# Patient Record
Sex: FEMALE | Race: WHITE | NOT HISPANIC OR LATINO | URBAN - METROPOLITAN AREA
[De-identification: names, ages, dates, MRNs, and addresses within clinical notes are randomized per-mention and may not be internally consistent; named-entity substitution may affect disease eponyms.]

---

## 2017-08-03 ENCOUNTER — OFFICE VISIT (OUTPATIENT)
Dept: INTERNAL MEDICINE | Facility: CLINIC | Age: 27
End: 2017-08-03

## 2017-08-03 VITALS
DIASTOLIC BLOOD PRESSURE: 67 MMHG | BODY MASS INDEX: 21.21 KG/M2 | HEIGHT: 66 IN | SYSTOLIC BLOOD PRESSURE: 98 MMHG | WEIGHT: 132 LBS | HEART RATE: 79 BPM | RESPIRATION RATE: 16 BRPM

## 2017-08-03 DIAGNOSIS — Z00.00 VISIT FOR PREVENTIVE HEALTH EXAMINATION: ICD-10-CM

## 2017-08-03 DIAGNOSIS — Z01.419 ENCOUNTER FOR GYNECOLOGICAL EXAMINATION WITHOUT ABNORMAL FINDING: ICD-10-CM

## 2017-08-03 DIAGNOSIS — Z11.3 SCREENING EXAMINATION FOR VENEREAL DISEASE: ICD-10-CM

## 2017-08-03 DIAGNOSIS — Z20.1 EXPOSURE TO TB: Primary | ICD-10-CM

## 2017-08-03 DIAGNOSIS — M54.50 CHRONIC BILATERAL LOW BACK PAIN WITHOUT SCIATICA: ICD-10-CM

## 2017-08-03 DIAGNOSIS — Z12.4 SCREENING FOR MALIGNANT NEOPLASM OF CERVIX: ICD-10-CM

## 2017-08-03 DIAGNOSIS — H61.23 BILATERAL IMPACTED CERUMEN: ICD-10-CM

## 2017-08-03 DIAGNOSIS — G89.29 CHRONIC BILATERAL LOW BACK PAIN WITHOUT SCIATICA: ICD-10-CM

## 2017-08-03 DIAGNOSIS — Z00.00 ENCOUNTER FOR ROUTINE ADULT HEALTH EXAMINATION WITHOUT ABNORMAL FINDINGS: ICD-10-CM

## 2017-08-03 ASSESSMENT — PAIN SCALES - GENERAL: PAINLEVEL: NO PAIN (0)

## 2017-08-03 NOTE — PATIENT INSTRUCTIONS
[facility] Center Medication Refill Request Information:  * Please contact your pharmacy regarding ANY request for medication refills.  ** [facility] Prescription Fax = [phone]  * Please allow 3 business days for routine medication refills.  * Please allow 5 business days for controlled substance medication refills.     [facility] Test Result notification information:  *You will be notified within 7-10 days of your appointment day regarding the results of your test.  If you are on MyChart you will be notified as soon as the provider has reviewed the results and signed off on them.    PREVENTATIVE HEALTH RECOMMENDATIONS    - You should be tested each year for STDs (sexually transmitted diseases), if you're at risk.   - Have a colonoscopy at age 50, or have a yearly FIT test (stool test). These exams will check for colon cancer.   - Have a cholesterol test every 5 years.   - Have a diabetes test (fasting glucose) every three years. If you are at risk for diabetes, you should have this test more often.   - Vaccines: Get a flu shot each year. Get a tetanus shot every 10 years.   - Eat at least 5 servings of fruits and vegetables daily.  - Eat whole-grain bread, whole-wheat pasta and brown rice instead of white grains and rice.  - For bone health:  Eat calcium-rich foods or take calcium pills (500 to 600 mg) twice a day with food. Also take vitamin D (1000 IUs) each day.   - If you are at risk for osteoporosis (brittle bone disease), think about having a bone density scan (DEXA).  - Exercise for at least 150 minutes a week (an average of 30 minutes a day, 5 days of the week). This will help you control your weight and prevent disease.  - Limit alcohol to one drink per day.  - No smoking.   - Wear sunscreen to prevent skin cancer.   - See your dentist twice a year for an exam and cleaning.  - See your eye doctor every 1 to 2 years.  - Get a Pap test each year. If you have 3 normal tests in a row, you may have the  test every 2 to 3 years. You do not need a Pap test if you've had a hysterectomy (removal of uterus) and have not had cancer. and   - Have a mammogram every 1 to 2 years

## 2017-08-03 NOTE — NURSING NOTE
Chief Complaint   Patient presents with     Establish Care     Pt is here to establish care.      Nancy Gomez LPN August 3, 2017 2:47 PM

## 2017-08-03 NOTE — PROGRESS NOTES
"  PRIMARY CARE CENTER         HPI:       Shante Lima is a 26 year old female who presents for the following  Patient presents with: Establish Care (Pt is here to establish care. )    Hopes to discuss chronic low back pain which occurs occasionally as a result of old sports injury. Seen by sports medicine in [place] and states that they wanted an MRI of her back but is not sure what their working diagnosis was. Thinks that there may have been some mention of a rotated pelvis and possible leg length descrepency. Last flare a few months ago.    Notes that she had a LEEP in 2008 for what she was told was an \"abnormal appearing cervix\". She is not aware of the results of this study. Does not recall mention of abnormal viral studies. Was involved in a research study while a student at [employer] and during screening, was told that she may have been exposed to TB in the past. No further workup. Denies hemoptysis, weight loss, night sweats.    Problem, Medication and Allergy Lists were reviewed and are current.  Patient is a new patient to this clinic and so  I reviewed/updated the Past Medical History, the Family History and the Social History.          Review of Systems:   Review of Systems     All other systems reviewed and are negative.  I have personally reviewed and updated the ROS on the day of the visit.           Physical Exam:   BP 98/67  Pulse 79  Resp 16  Ht 1.676 m (5' 6\")  Wt 59.9 kg (132 lb)  LMP 07/29/2017 (Exact Date)  BMI 21.31 kg/m2  Body mass index is 21.31 kg/(m^2).  Vitals were reviewed       GENERAL APPEARANCE: healthy, alert and no distress     EYES: EOMI, PERRL     HENT: b/l cerumen obscuring ear TM's normal and nose and mouth without ulcers or lesions     NECK: no adenopathy, no asymmetry, masses, or scars and thyroid normal to palpation     RESP: lungs clear to auscultation - no rales, rhonchi or wheezes     CV: regular rates and rhythm, normal S1 S2, no S3 or S4 and no murmur, click or " rub     ABDOMEN:  soft, nontender, no HSM or masses and bowel sounds normal     : normal cervix, adnexae, and uterus without masses or discharge     MS: extremities normal- no gross deformities noted, no evidence of inflammation in joints, FROM in all extremities.     SKIN: no suspicious lesions or rashes     NEURO: Normal strength and tone, sensory exam grossly normal, mentation intact and speech normal     PSYCH: mentation appears normal. and affect normal/bright     LYMPHATICS: No axillary, cervical, or supraclavicular nodes        Results:      Results from the last 24 hoursNo results found for this or any previous visit (from the past 24 hour(s)).  Assessment and Plan     Shante was seen today for establish care.    Diagnoses and all orders for this visit:    Encounter for gynecological examination without abnormal finding  Screening for malignant neoplasm of cervix  Hx of LEEP for unclear reasons. Last pap ~3 years ago. Denies history of abnormal paps.  -     Pap Smear Exam []  -     Pelvic and Breast Exam Procedure []  -     Pap imaged thin layer screen reflex to HPV if ASCUS - recommended age 25 - 29 years    Screening examination for venereal disease  -     HIV Antigen Antibody Combo [WJG2115]; Future  -     Anti Treponema [QKG8557]; Future  -     Chlamydia trachomatis PCR [THR919]; Future  -     Neisseria gonorrhoeae PCR [FVQ2704]; Future    Encounter for routine adult health examination without abnormal findings  Low risk for abnormal lipids. Tdap 11/27/12.  -     Lipid panel reflex to direct LDL; Future    Exposure to TB  Patient has possible exposure to TB given [place] Reported abnormal result during research study.   -     M Tuberculosis by Quantiferon; Future    Bilateral impacted cerumen  -     REMOVE IMPACTED CERUMEN    Chronic bilateral low back pain without sciatica  2/2 sports injury, followed in [place]. Planned for MRI for unclear reasons.        -      Sports Medicine referral    Options for treatment and follow-up care were reviewed with the patient. Shante Lima engaged in the decision making process and verbalized understanding of the options discussed and agreed with the final plan.    Joshua Rivas MD  Aug 3, 2017    Pt was seen and plan of care discussed with Dr Brush.     Pt was seen and examined with Dr. Rivas.  I agree with his documentation as noted above.    My additional comments: None    Nino Brush MD

## 2017-08-03 NOTE — MR AVS SNAPSHOT
After Visit Summary   8/3/2017    Shante Lima    MRN: [id number]           Patient Information     Date Of Birth          1990        Visit Information        Provider Department      8/3/2017 2:40 PM Joshua Rivas MD [facility]        Today's Diagnoses     Exposure to TB    -  1    Screening examination for venereal disease        Visit for preventive health examination        Screening for malignant neoplasm of cervix        Encounter for gynecological examination without abnormal finding        Encounter for routine adult health examination without abnormal findings        Bilateral impacted cerumen          Care Instructions    Primary Care Center Medication Refill Request Information:  * Please contact your pharmacy regarding ANY request for medication refills.  ** [facility] Prescription Fax = [phone]  * Please allow 3 business days for routine medication refills.  * Please allow 5 business days for controlled substance medication refills.     [facility] Test Result notification information:  *You will be notified within 7-10 days of your appointment day regarding the results of your test.  If you are on MyChart you will be notified as soon as the provider has reviewed the results and signed off on them.    PREVENTATIVE HEALTH RECOMMENDATIONS    - You should be tested each year for STDs (sexually transmitted diseases), if you're at risk.   - Have a colonoscopy at age 50, or have a yearly FIT test (stool test). These exams will check for colon cancer.   - Have a cholesterol test every 5 years.   - Have a diabetes test (fasting glucose) every three years. If you are at risk for diabetes, you should have this test more often.   - Vaccines: Get a flu shot each year. Get a tetanus shot every 10 years.   - Eat at least 5 servings of fruits and vegetables daily.  - Eat whole-grain bread, whole-wheat pasta and brown rice instead of white grains and rice.  - For  bone health:  Eat calcium-rich foods or take calcium pills (500 to 600 mg) twice a day with food. Also take vitamin D (1000 IUs) each day.   - If you are at risk for osteoporosis (brittle bone disease), think about having a bone density scan (DEXA).  - Exercise for at least 150 minutes a week (an average of 30 minutes a day, 5 days of the week). This will help you control your weight and prevent disease.  - Limit alcohol to one drink per day.  - No smoking.   - Wear sunscreen to prevent skin cancer.   - See your dentist twice a year for an exam and cleaning.  - See your eye doctor every 1 to 2 years.  - Get a Pap test each year. If you have 3 normal tests in a row, you may have the test every 2 to 3 years. You do not need a Pap test if you've had a hysterectomy (removal of uterus) and have not had cancer. and   - Have a mammogram every 1 to 2 years          Follow-ups after your visit        Follow-up notes from your care team     Return in 1 year (on 8/3/2018) for Preventative Health Visit.      Future tests that were ordered for you today     Open Future Orders        Priority Expected Expires Ordered    HIV Antigen Antibody Combo [NBH5426] Routine  8/3/2018 8/3/2017    Anti Treponema [FZJ0217] Routine  8/3/2018 8/3/2017    Chlamydia trachomatis PCR [BVZ531] Routine  8/3/2018 8/3/2017    Neisseria gonorrhoeae PCR [RDK7697] Routine  8/3/2018 8/3/2017    M Tuberculosis by Quantiferon Routine 8/3/2017 8/3/2018 8/3/2017    Lipid panel reflex to direct LDL Routine 8/3/2017 8/17/2017 8/3/2017            Who to contact     Please call your clinic at [phone] to:    Ask questions about your health    Make or cancel appointments    Discuss your medicines    Learn about your test results    Speak to your doctor   If you have compliments or concerns about an experience at your clinic, or if you wish to file a complaint, please contact [facility] Patient Relations at [phone] or email us at  [email or web address]         Additional Information About Your Visit        [employer] Information     K[employer] is an electronic gateway that provides easy, online access to your medical records. With Vettro, you can request a clinic appointment, read your test results, renew a prescription or communicate with your care team.     To sign up for Vettro visit the website at [email or web address]   You will be asked to enter the access code listed below, as well as some personal information. Please follow the directions to create your username and password.     Your access code is: [email or web address]  Expires: 10/25/2017 11:35 AM     Your access code will  in 90 days. If you need help or a new code, please contact your [facility] or call [phone] for assistance.        Care EveryWhere ID     This is your Care EveryWhere ID. This could be used by other organizations to access your [facility] medical records  [id number]        Your Vitals Were     Pulse Respirations Height Last Period BMI (Body Mass Index)       79 16 1.676 m (5' 6\") 2017 (Exact Date) 21.31 kg/m2        Blood Pressure from Last 3 Encounters:   17 98/67    Weight from Last 3 Encounters:   17 59.9 kg (132 lb)              We Performed the Following     Pap imaged thin layer screen reflex to HPV if ASCUS - recommended age 25 - 29 years     Pap Smear Exam []     Pelvic and Breast Exam Procedure []     REMOVE IMPACTED CERUMEN        Primary Care Provider    No Pcp Confirmed       No address on file        Equal Access to Services     SUMA ROSALES : Hadii aad ku hadasho Sobetzaidaali, waaxda luqadaha, qaybta kaalmada adeegyada, tacos roque . So [facility] [phone].    ATENCIÓN: Si habla español, tiene a gonzales disposición servicios gratuitos de asistencia lingüística. Llame al [phone].    We comply with applicable federal civil rights laws and [place] " laws. We do not discriminate on the basis of race, color, national origin, age, disability sex, sexual orientation or gender identity.            Thank you!     Thank you for choosing [facility]  for your care. Our goal is always to provide you with excellent care. Hearing back from our patients is one way we can continue to improve our services. Please take a few minutes to complete the written survey that you may receive in the mail after your visit with us. Thank you!             Your Updated Medication List - Protect others around you: Learn how to safely use, store and throw away your medicines at www.disposemymeds.org.      Notice  As of 8/3/2017  3:40 PM    You have not been prescribed any medications.

## 2017-08-04 LAB
C TRACH DNA SPEC QL NAA+PROBE: NORMAL
N GONORRHOEA DNA SPEC QL NAA+PROBE: NORMAL
SPECIMEN SOURCE: NORMAL
SPECIMEN SOURCE: NORMAL

## 2017-08-07 ENCOUNTER — TELEPHONE (OUTPATIENT)
Dept: INTERNAL MEDICINE | Facility: CLINIC | Age: 27
End: 2017-08-07

## 2017-08-07 NOTE — TELEPHONE ENCOUNTER
"----- Message from Joshua Rivas MD sent at 8/7/2017  7:58 AM CDT -----  Regarding: results  Hi Amy!    Would you be able to call Shante to let her know:    \"your std tests for chlamydia and gonorrhea are both negative\"    Thanks!  Joshua  ----------------  Message given to pt, verbalize understanding.  Amy Duckworth RN    "

## 2017-08-08 ENCOUNTER — TELEPHONE (OUTPATIENT)
Dept: INTERNAL MEDICINE | Facility: CLINIC | Age: 27
End: 2017-08-08

## 2017-08-08 LAB
COPATH REPORT: NORMAL
PAP: NORMAL

## 2019-04-03 ENCOUNTER — OFFICE VISIT (OUTPATIENT)
Dept: PODIATRY | Facility: CLINIC | Age: 29
End: 2019-04-03
Payer: COMMERCIAL

## 2019-04-03 VITALS
HEART RATE: 60 BPM | HEIGHT: 66 IN | WEIGHT: 141 LBS | SYSTOLIC BLOOD PRESSURE: 100 MMHG | BODY MASS INDEX: 22.66 KG/M2 | DIASTOLIC BLOOD PRESSURE: 70 MMHG

## 2019-04-03 DIAGNOSIS — R22.42 MASS OF LEFT FOOT: ICD-10-CM

## 2019-04-03 DIAGNOSIS — M79.672 ARCH PAIN OF LEFT FOOT: ICD-10-CM

## 2019-04-03 DIAGNOSIS — M72.2 PLANTAR FASCIAL FIBROMATOSIS: Primary | ICD-10-CM

## 2019-04-03 PROCEDURE — 99204 OFFICE O/P NEW MOD 45 MIN: CPT | Performed by: PODIATRIST

## 2019-04-03 ASSESSMENT — MIFFLIN-ST. JEOR: SCORE: 1391.32

## 2019-04-03 NOTE — LETTER
4/3/2019         RE: Shante Lima  [address] [address]        Dear Colleague,    Thank you for referring your patient, Shante Lima, to the [facility]. Please see a copy of my visit note below.    PATIENT HISTORY:  Shante Lima is a 28 year old female who presents to clinic for L arch pain, tightness, also a lump on the bottom of her left foot for years.  Also reports some pain into the L lower leg, knee area with activity.  2-6/10 pain.  No treatments.       Review of Systems:  Patient denies fever, chills, rash, wound, stiffness, limping, numbness, weakness, heart burn, blood in stool, chest pain with activity, calf pain when walking, shortness of breath with activity, chronic cough, easy bleeding/bruising, swelling of ankles, excessive thirst, fatigue, depression, anxiety.       PAST MEDICAL HISTORY:   Past Medical History:   Diagnosis Date     NO ACTIVE PROBLEMS         PAST SURGICAL HISTORY:   Past Surgical History:   Procedure Laterality Date     LEEP TX, CERVICAL  2008        MEDICATIONS: No current outpatient medications on file.     ALLERGIES:  No Known Allergies     SOCIAL HISTORY:   Social History     Socioeconomic History     Marital status: Single     Spouse name: Not on file     Number of children: Not on file     Years of education: Not on file     Highest education level: Not on file   Occupational History     Occupation: grocery delivery     Occupation:    Social Needs     Financial resource strain: Not on file     Food insecurity:     Worry: Not on file     Inability: Not on file     Transportation needs:     Medical: Not on file     Non-medical: Not on file   Tobacco Use     Smoking status: Never Smoker     Smokeless tobacco: Never Used   Substance and Sexual Activity     Alcohol use: Yes     Alcohol/week: 4.8 oz     Types: 8 Standard drinks or equivalent per week     Drug use: No     Sexual activity: Not on file  "  Lifestyle     Physical activity:     Days per week: Not on file     Minutes per session: Not on file     Stress: Not on file   Relationships     Social connections:     Talks on phone: Not on file     Gets together: Not on file     Attends Congregation service: Not on file     Active member of club or organization: Not on file     Attends meetings of clubs or organizations: Not on file     Relationship status: Not on file     Intimate partner violence:     Fear of current or ex partner: Not on file     Emotionally abused: Not on file     Physically abused: Not on file     Forced sexual activity: Not on file   Other Topics Concern     Not on file   Social History Narrative     Not on file        FAMILY HISTORY: No pertinent family history.     EXAM:Vitals: /70   Pulse 60   Ht 1.676 m (5' 6\")   Wt 64 kg (141 lb)   BMI 22.76 kg/m     BMI= Body mass index is 22.76 kg/m .    General appearance: Patient is alert and fully cooperative with history & exam.  No sign of distress is noted during the visit.     Psychiatric: Affect is pleasant & appropriate.  Patient appears motivated to improve health.     Respiratory: Breathing is regular & unlabored while sitting.     HEENT: Hearing is intact to spoken word.  Speech is clear.  No gross evidence of visual impairment that would impact ambulation.     Dermatologic: Skin is intact to L foot.  No paronychia or evidence of soft tissue infection is noted.     Vascular: DP & PT pulses are intact & regular on the L.  No significant edema or varicosities noted.  CFT and skin temperature are normal to both lower extremities.     Neurologic: Lower extremity sensation is intact to light touch.  No evidence of weakness or contracture in the lower extremities.  No evidence of neuropathy.  Neg Tinel's with percussion of the L tibial n.     Musculoskeletal:  Fairly neutral foot type b/l.  Mild pain with palpation along the medial band of the L plantar fascia.  No L lower leg pain " appreciated at this time.  Small firm circumscribed soft tissue mass <1cm plantarly just proximal to the 2nd MPJ area.  Not particularly painful.  Patient is ambulatory without assistive device or brace.  No gross ankle deformity noted.  No foot or ankle joint effusion is noted.     ASSESSMENT:   L arch pain, suspect mild plantar fasciitis  L foot soft tissue mass     PLAN:  Reviewed patient's chart in epic.  Discussed condition and treatment options including pros and cons.    L foot pain difficult to entirely localize, but suspect some plantar fasciitis along the medial band.  Advised icing, stretching, superfeet inserts.  Avoid barefoot walking.  Advised PCP f/u for knee pain/proximal leg pain if this continues.      Mass is likely benign fibroma.  Other etiology possible.  Discussed options including padding, orthotics, resection.  Steroid injections have limited benefit.    Handout given.      Pt will consider recommended MRI for workup.  Ordered.  Will f/u with results.    F/u prn otherwise.    Narendra King DPM, FACFAS          Again, thank you for allowing me to participate in the care of your patient.        Sincerely,        Narendra King DPM

## 2019-04-03 NOTE — PATIENT INSTRUCTIONS
Thank you for choosing [facility]!    Follow up as needed    DR. LUCIANO'S CLINIC LOCATIONS     MONDAY  [place] TUESDAY & FRIDAY AM  [place]   [address]   [address]   [place] [address] [place] [address]   [phone]  [phone]  [phone]       WEDNESDAY  [place] SCHEDULE SURGERY: [phone]   [address] [facility] APPOINTMENTS: [phone]   [place] [address] BILLING QUESTIONS: [phone]   [phone]       [place] [facility] SCHEDULING  They should be calling you within 24 hours to schedule your scan.  If not, please call the location discussed at your appointment.    1) [facility]:       [phone]      [address] [place] [address]    2) [facility]:      [phone]      [address]      [place] [address]    3) [facility]:       [phone]      [address] [place] [address]    * We will call you with the results. Please allow 24-48 hours for the results to be read and final.        PLANTAR FASCIITIS  Plantar fasciitis is often referred to as heel spurs or heel pain. Plantar fasciitis is a very common problem that affects people of all foot shapes, age, weight and activity level. Pain may be in the arch or on the weight-bearing surface of the heel. The pain may come on without injury or identifiable cause. Pain is generally present when first getting out of bed in the morning or up from a seated break.     CAUSES  The plantar fascia is a dense fibrous band of tissue that stretches across the bottom surface of the foot. The fascia helps support the foot muscles and arch. Plantar fasciitis is thought to be caused by mechanical strain or overload. Frequent walking without shoes or wearing unsupportive shoes is thought to cause structural overload and ultimately inflammation of the plantar fascia. Some  people have heel spurs that can be seen on x-ray. The heel spur is actually a minor component of plantar fascitis and is largely ignored.       SELF TREATMENT   The easiest solution is to stop walking around your home without shoes. Plantar fasciitis is largely a shoe problem. Shoes are either not being worn often enough or your current shoes are inadequate for your weight, foot structure or activity level. The majority of shoes on the market today are not sufficient to resist development of plantar fasciitis or to promote healing. Assume that your current shoes are inadequate and will need to be replaced. Even high quality shoes wear out with 6 months to one year of frequent use. Weight loss is another option. Losing ten pounds in the next two months may be enough to resolve the problem. Ice applied to the area of pain two to three times per day for ten minutes each session can be very helpful. Warm foot soaks in epsom salts can also relieve pain. This should continue until the problem resolves. Achilles tendon stretching is essential. Stretch multiple times daily to promote healing and to prevent recurrence in the future. Over all stretching of the body is helpful as well such as the calves, thighs and lower back. Normally when one area of the body is tight, other areas are too. Gentle Yoga can be good for this.     Over the counter topical anti inflammatories can be helpful such as biofreeze, bengay, salon pas, ect...  Oral ibuprofen or aleve is recommended as well to try to calm down inflammation.     Night splints can be helpful to gradually stretch the foot at night as a lot of pain is when you get up in the morning. Taking a towel or thera band and stretching the foot back multiple times before you get ou of bed can be beneficial as well.     MEDICAL TREATMENT  Medical treatments often include custom arch supports, cortisone injections, physical therapy, splints to be worn in bed, prescription medications and  surgery. The home treatments listed above will be necessary regardless of these advanced medical treatments. Surgery is rarely needed but is very helpful in selected cases.     PROGNOSIS  Plantar fasciitis can last from one day to a lifetime. Some people get intermittent fascitis that is very short-lived. Others suffer daily for years. Excessive body weight, frequent bare foot walking, long hours on the feet, inadequate shoes, predisposing foot structures and excessive activity such as running are all potential issues that lead to chronic and/or recurring plantar fascitis. Having plantar fasciitis means that you are forever prone to this problem and will require modification of some of the above factors. Most people seek treatment within one to four months. Healing usually requires a similar one to four month time frame. Healing time is relative to the amount of effort spent treating the problem.   Plantar fasciitis is highly recurrent. Risk factors often continue, including return to bare foot walking, inadequate shoes, excessive body weight, excessive activities, etc. Your life style and foot structure may predispose you to recurrent plantar fasciitis. A daily prevention regimen can be very helpful. Ongoing use of shoe inserts, careful attention to appropriate shoes, daily Achilles stretching, etc. may prevent recurrence. Prompt attention at the earliest warning signs of heel pain can resolve the problem in as short as a few days.     EXERCISES  Stair Exercise: Step on the stairs with the ball of your foot and hold your position for at least 15 seconds, then slowly step down with the heels of your foot. You can do this daily and as often as you want.   Picking the Towel: Sit comfortably and then pick the towel up with your toes. You can use any object other than a towel as long as the material can be soft and you can pick it up with your toes.  Rolling the Bottle: Use a small ball or frozen water bottle and then  roll it around with your foot.   Flex the Toes: Sit comfortably and then flex your toes by pointing it towards the floor or towards your body. This will relax and flex your foot and exercise your plantar fascia, the calf, and the Achilles tendon. The inability of the foot to stretch often causes the bunching up of the plantar fascia area leading to the pain.  Calf/Achilles Stretching: Lay on you back and raise one foot, then point your toes towards the floor. See photo below:               Hold each stretch for 10 seconds. Stretch 10 times per set, three sets per day. Morning, afternoon and evening. If your heel pain is very severe in the morning, consider doing the first set of stretches before you get out of bed.      OVER THE COUNTER INSERT RECOMMENDATIONS  SuperFeet   Sofsole Fit Spenco   Power Step   Walk-Fit Arch Cradles     Most of these can be found at your local [employer], [employer], or online.  **A good high quality over the counter insert should cost around $40-$50      [employer] LOCATIONS  [address]  [phone]   [address]  [phone] [place]  [address] [facility]  [phone]   [place]  [address]  [phone]   [place]  [address] [facility]  [phone] [place] [address]  [phone]   [place]  [address]  [phone]   [place]  [address]  [phone] [place]  [address]  [phone]       PLANTAR FIBROMA  A plantar fibroma is a fibrous knot (nodule) in the arch of the foot. It is embedded within the plantar fascia, a band of tissue that extends from the heel to the toes on the bottom of the foot. A plantar fibroma can develop in one or both feet, is benign (non-malignant), and usually will not go away or get smaller without treatment. Definitive causes for this condition have not been clearly identified.    SIGNS & SYMPTOMS  The characteristic sign of a  plantar fibroma is a noticeable lump in the arch that feels firm to the touch. This mass can remain the same size or get larger over time, or additional fibromas may develop.  People who have a plantar fibroma may or may not have pain. When pain does occur, it is often caused by shoes pushing against the lump in the arch, although it can also arise when walking or standing barefoot.    DIAGNOSIS  To diagnose a plantar fibroma, the foot and ankle surgeon will examine the foot and press on the affected area. Sometimes this can produce pain that extends down to the toes. An MRI or biopsy may be performed to further evaluate the lump and aid in diagnosis.    TREATMENT OPTIONS   Non-surgical treatment may help relieve the pain of a plantar fibroma, although it will not make the mass disappear. The foot and ankle surgeon may select one or more of the following non-surgical options:     Steroid injections. Injecting corticosteroid medication into the mass may help  shrink it and thereby relieve the pain that occurs when walking. This reduction  may be only temporary and the fibroma could slowly return to its original size.      Orthotic devices. If the fibroma is stable, meaning it is not changing in size,  custom orthotic devices (shoe inserts) may relieve the pain by distributing the  patient s weight away from the fibroma.      Physical therapy. The pain is sometimes treated through physical therapy  methods that deliver anti-inflammatory medication into the fibroma without the  need for injection.  Cross friction massage.     Verapamil Cream: Applying 10% or higher verapamil cream can help to decrease  size.     Surgery: If the mass increases in size or pain, the patient should be further  evaluated. Surgical treatment to remove the fibroma is considered if the patient  continues to experience pain following non-surgical approaches. Surgical  removal of a plantar fibroma may result in a flattening of the arch or   development of hammertoes. Orthotic devices may be prescribed to provide  support to the foot. Due to the high incidence of recurrence with this condition,  continued follow-up with the foot and ankle surgeon is recommended.          BODY WEIGHT AND YOUR FEET  The following information is included in the after visit summary for all patients. Body weight can be a sensitive issue to discuss in clinic, but we think the following information is very important. Although we focus on the feet and ankles, we do support the overall health of our patients.     Many things can cause foot and ankle problems. Foot structure, activity level, foot mechanics and injuries are common causes of pain. One very important issue that often goes unmentioned, is body weight. Extra weight can cause increased stress on muscles, ligaments, bones and tendons. Sometimes just a few extra pounds is all it takes to put one over her/his threshold. Without reducing that stress, it can be difficult to alleviate pain. As Foot & Ankle specialists, our job is addressing the lower extremity problem and possible causes. Regarding extra body weight, we encourage patients to discuss diet and weight management plans with their primary care doctors. It is this team approach that gives you the best opportunity for pain relief and getting you back on your feet.      [place] has a Comprehensive Weight Management Program. This program includes counseling, education, non-surgical and surgical approaches to weight loss. If you are interested in learning more either talk to you primary care provider or call [phone].

## 2019-04-04 NOTE — PROGRESS NOTES
PATIENT HISTORY:  Shante Lima is a 28 year old female who presents to clinic for L arch pain, tightness, also a lump on the bottom of her left foot for years.  Also reports some pain into the L lower leg, knee area with activity.  2-6/10 pain.  No treatments.       Review of Systems:  Patient denies fever, chills, rash, wound, stiffness, limping, numbness, weakness, heart burn, blood in stool, chest pain with activity, calf pain when walking, shortness of breath with activity, chronic cough, easy bleeding/bruising, swelling of ankles, excessive thirst, fatigue, depression, anxiety.       PAST MEDICAL HISTORY:   Past Medical History:   Diagnosis Date     NO ACTIVE PROBLEMS         PAST SURGICAL HISTORY:   Past Surgical History:   Procedure Laterality Date     LEEP TX, CERVICAL  2008        MEDICATIONS: No current outpatient medications on file.     ALLERGIES:  No Known Allergies     SOCIAL HISTORY:   Social History     Socioeconomic History     Marital status: Single     Spouse name: Not on file     Number of children: Not on file     Years of education: Not on file     Highest education level: Not on file   Occupational History     Occupation: grocery delivery     Occupation:    Social Needs     Financial resource strain: Not on file     Food insecurity:     Worry: Not on file     Inability: Not on file     Transportation needs:     Medical: Not on file     Non-medical: Not on file   Tobacco Use     Smoking status: Never Smoker     Smokeless tobacco: Never Used   Substance and Sexual Activity     Alcohol use: Yes     Alcohol/week: 4.8 oz     Types: 8 Standard drinks or equivalent per week     Drug use: No     Sexual activity: Not on file   Lifestyle     Physical activity:     Days per week: Not on file     Minutes per session: Not on file     Stress: Not on file   Relationships     Social connections:     Talks on phone: Not on file     Gets together: Not on file     Attends Voodoo service: Not on  "file     Active member of club or organization: Not on file     Attends meetings of clubs or organizations: Not on file     Relationship status: Not on file     Intimate partner violence:     Fear of current or ex partner: Not on file     Emotionally abused: Not on file     Physically abused: Not on file     Forced sexual activity: Not on file   Other Topics Concern     Not on file   Social History Narrative     Not on file        FAMILY HISTORY: No pertinent family history.     EXAM:Vitals: /70   Pulse 60   Ht 1.676 m (5' 6\")   Wt 64 kg (141 lb)   BMI 22.76 kg/m    BMI= Body mass index is 22.76 kg/m .    General appearance: Patient is alert and fully cooperative with history & exam.  No sign of distress is noted during the visit.     Psychiatric: Affect is pleasant & appropriate.  Patient appears motivated to improve health.     Respiratory: Breathing is regular & unlabored while sitting.     HEENT: Hearing is intact to spoken word.  Speech is clear.  No gross evidence of visual impairment that would impact ambulation.     Dermatologic: Skin is intact to L foot.  No paronychia or evidence of soft tissue infection is noted.     Vascular: DP & PT pulses are intact & regular on the L.  No significant edema or varicosities noted.  CFT and skin temperature are normal to both lower extremities.     Neurologic: Lower extremity sensation is intact to light touch.  No evidence of weakness or contracture in the lower extremities.  No evidence of neuropathy.  Neg Tinel's with percussion of the L tibial n.     Musculoskeletal:  Fairly neutral foot type b/l.  Mild pain with palpation along the medial band of the L plantar fascia.  No L lower leg pain appreciated at this time.  Small firm circumscribed soft tissue mass <1cm plantarly just proximal to the 2nd MPJ area.  Not particularly painful.  Patient is ambulatory without assistive device or brace.  No gross ankle deformity noted.  No foot or ankle joint effusion is " noted.     ASSESSMENT:   L arch pain, suspect mild plantar fasciitis  L foot soft tissue mass     PLAN:  Reviewed patient's chart in epic.  Discussed condition and treatment options including pros and cons.    L foot pain difficult to entirely localize, but suspect some plantar fasciitis along the medial band.  Advised icing, stretching, superfeet inserts.  Avoid barefoot walking.  Advised PCP f/u for knee pain/proximal leg pain if this continues.      Mass is likely benign fibroma.  Other etiology possible.  Discussed options including padding, orthotics, resection.  Steroid injections have limited benefit.    Handout given.      Pt will consider recommended MRI for workup.  Ordered.  Will f/u with results.    F/u prn otherwise.    Narendra King DPM, FACFAS

## 2019-04-09 ENCOUNTER — OFFICE VISIT (OUTPATIENT)
Dept: FAMILY MEDICINE | Facility: CLINIC | Age: 29
End: 2019-04-09
Payer: COMMERCIAL

## 2019-04-09 VITALS
WEIGHT: 139 LBS | BODY MASS INDEX: 22.34 KG/M2 | TEMPERATURE: 97.2 F | DIASTOLIC BLOOD PRESSURE: 65 MMHG | SYSTOLIC BLOOD PRESSURE: 100 MMHG | HEART RATE: 56 BPM | HEIGHT: 66 IN

## 2019-04-09 DIAGNOSIS — M54.9 UPPER BACK PAIN ON LEFT SIDE: ICD-10-CM

## 2019-04-09 DIAGNOSIS — Z00.00 ROUTINE HISTORY AND PHYSICAL EXAMINATION OF ADULT: Primary | ICD-10-CM

## 2019-04-09 PROCEDURE — 99395 PREV VISIT EST AGE 18-39: CPT | Performed by: FAMILY MEDICINE

## 2019-04-09 ASSESSMENT — ENCOUNTER SYMPTOMS
SHORTNESS OF BREATH: 0
JOINT SWELLING: 0
HEMATURIA: 0
HEARTBURN: 0
PARESTHESIAS: 0
CONSTIPATION: 0
FREQUENCY: 0
NERVOUS/ANXIOUS: 0
ARTHRALGIAS: 0
PALPITATIONS: 0
WEAKNESS: 0
COUGH: 0
HEMATOCHEZIA: 0
CHILLS: 0
NAUSEA: 0
ABDOMINAL PAIN: 0
HEADACHES: 0
BREAST MASS: 0
DIZZINESS: 0
EYE PAIN: 0
FEVER: 0
DIARRHEA: 0
MYALGIAS: 0
SORE THROAT: 0
DYSURIA: 0

## 2019-04-09 ASSESSMENT — MIFFLIN-ST. JEOR: SCORE: 1385.74

## 2019-04-09 NOTE — PROGRESS NOTES
SUBJECTIVE:   CC: Shante Lima is an 28 year old woman who presents for preventive health visit.     Healthy Habits:     Getting at least 3 servings of Calcium per day:  Yes    Bi-annual eye exam:  Yes    Dental care twice a year:  NO    Sleep apnea or symptoms of sleep apnea:  None    Diet:  Vegetarian/vegan    Frequency of exercise:  4-5 days/week    Duration of exercise:  30-45 minutes    Taking medications regularly:  Not Applicable    Medication side effects:  Not applicable    PHQ-2 Total Score: 0    Additional concerns today:  No    OTHER CONCERNS:  Patient would like to check on left shoulder/ back problem.  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  Continues to hurt and also notes decreased sensation (specifically with hot and cold).  No weakness.  She has tried chiro and cupping in the past.  Also does yoga, which helps.  No imaging in the past.      Today's PHQ-2 Score:   PHQ-2 ( 1999 [employer]) 4/9/2019   Q1: Little interest or pleasure in doing things 0   Q2: Feeling down, depressed or hopeless 0   PHQ-2 Score 0   Q1: Little interest or pleasure in doing things Not at all   Q2: Feeling down, depressed or hopeless Not at all   PHQ-2 Score 0       Abuse: Current or Past(Physical, Sexual or Emotional)- No  Do you feel safe in your environment? Yes    Social History     Tobacco Use     Smoking status: Never Smoker     Smokeless tobacco: Never Used   Substance Use Topics     Alcohol use: Not Currently     Alcohol/week: 4.8 oz     Types: 8 Standard drinks or equivalent per week         Alcohol Use 4/9/2019   Prescreen: >3 drinks/day or >7 drinks/week? No   Prescreen: >3 drinks/day or >7 drinks/week? -   No flowsheet data found.    Reviewed orders with patient.  Reviewed health maintenance and updated orders accordingly - Yes  Patient Active Problem List   Diagnosis     Upper back pain on left side     Past Surgical History:   Procedure Laterality Date     LEEP TX, CERVICAL   "2008       Social History     Tobacco Use     Smoking status: Never Smoker     Smokeless tobacco: Never Used   Substance Use Topics     Alcohol use: Not Currently     Alcohol/week: 4.8 oz     Types: 8 Standard drinks or equivalent per week     History reviewed. No pertinent family history.        Mammogram not appropriate for this patient based on age.    Pertinent mammograms are reviewed under the imaging tab.  History of abnormal Pap smear: NO - age 21-29 PAP every 3 years recommended  PAP / HPV 8/3/2017   PAP NIL     Reviewed and updated as needed this visit by clinical staff         Reviewed and updated as needed this visit by Provider            Review of Systems   Constitutional: Negative for chills and fever.   HENT: Positive for ear pain. Negative for congestion, hearing loss and sore throat.    Eyes: Negative for pain and visual disturbance.   Respiratory: Negative for cough and shortness of breath.    Cardiovascular: Negative for chest pain, palpitations and peripheral edema.   Gastrointestinal: Negative for abdominal pain, constipation, diarrhea, heartburn, hematochezia and nausea.   Breasts:  Negative for tenderness, breast mass and discharge.   Genitourinary: Negative for dysuria, frequency, genital sores, hematuria, pelvic pain, urgency, vaginal bleeding and vaginal discharge.   Musculoskeletal: Negative for arthralgias, joint swelling and myalgias.   Skin: Negative for rash.   Neurological: Negative for dizziness, weakness, headaches and paresthesias.   Psychiatric/Behavioral: Negative for mood changes. The patient is not nervous/anxious.         OBJECTIVE:   /65   Pulse 56   Temp 97.2  F (36.2  C) (Oral)   Ht 1.682 m (5' 6.22\")   Wt 63 kg (139 lb)   LMP 03/29/2019   BMI 22.29 kg/m    Physical Exam  GENERAL: healthy, alert and no distress  EYES: Eyes grossly normal to inspection, PERRL and conjunctivae and sclerae normal  HENT: ear canals and TM's normal, nose and mouth without ulcers or " "lesions  NECK: no adenopathy, no asymmetry, masses, or scars and thyroid normal to palpation  RESP: lungs clear to auscultation - no rales, rhonchi or wheezes  BREAST: normal without masses, tenderness or nipple discharge and no palpable axillary masses or adenopathy  CV: regular rate and rhythm, normal S1 S2, no S3 or S4, no murmur, click or rub, no peripheral edema and peripheral pulses strong  ABDOMEN: soft, nontender, no hepatosplenomegaly, no masses and bowel sounds normal  MS: Left scapula with mild winging.  +TTP along left rhomboids and traps.  Normal UE muscle strength   SKIN: no suspicious lesions or rashes  NEURO: Normal strength and tone, mentation intact and speech normal  PSYCH: mentation appears normal, affect normal/bright    ASSESSMENT/PLAN:   1. Routine history and physical examination of adult    2. Upper back pain on left side  - Chronic left upper back pain with mild scapular winging  - Possible long thoracic nerve injury?  - Will refer to physical therapy   - If no improvement, will refer to [facility]  - [facility], HAND, AND CHIROPRACTIC REFERRAL; Future    COUNSELING:  Reviewed preventive health counseling, as reflected in patient instructions    BP Readings from Last 1 Encounters:   04/09/19 100/65     Estimated body mass index is 22.29 kg/m  as calculated from the following:    Height as of this encounter: 1.682 m (5' 6.22\").    Weight as of this encounter: 63 kg (139 lb).     reports that she has never smoked. She has never used smokeless tobacco.      Counseling Resources:  ATP IV Guidelines  Pooled Cohorts Equation Calculator  Breast Cancer Risk Calculator  FRAX Risk Assessment  ICSI Preventive Guidelines  Dietary Guidelines for Americans, 2010  USDA's MyPlate  ASA Prophylaxis  Lung CA Screening    Ghislaine Greenwood,   [facility]  "

## 2019-04-29 ENCOUNTER — THERAPY VISIT (OUTPATIENT)
Dept: PHYSICAL THERAPY | Facility: CLINIC | Age: 29
End: 2019-04-29
Attending: FAMILY MEDICINE
Payer: COMMERCIAL

## 2019-04-29 DIAGNOSIS — M54.9 UPPER BACK PAIN ON LEFT SIDE: ICD-10-CM

## 2019-04-29 PROCEDURE — 97161 PT EVAL LOW COMPLEX 20 MIN: CPT | Mod: GP | Performed by: PHYSICAL THERAPIST

## 2019-04-29 PROCEDURE — 97110 THERAPEUTIC EXERCISES: CPT | Mod: GP | Performed by: PHYSICAL THERAPIST

## 2019-04-29 PROCEDURE — 97112 NEUROMUSCULAR REEDUCATION: CPT | Mod: GP | Performed by: PHYSICAL THERAPIST

## 2019-04-29 NOTE — PROGRESS NOTES
Physical Therapy Initial Evaluation    Precautions/Restrictions/MD instructions: PT eval and treat.       Subjective History:    Injury/Condition Details:  Presenting Complaint Shante presents with left sided thoracic and shoulder pain, ongoing for 3-4 years. 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 Was constantly looking over her shoulders. Developed a sharp, searing pain near her shoulder which has improved over time but is still present. Since then has had a more chronic, low level pain. Notices less sensitivity in that area, can't feel heat from showerhead. Notices a pinching pain near the top of the shoulder blade. Functionally doesn't notice any weakness, does get irritated by end of serving shift.    Onset Timing/Date MD referral 4/9/19   Mechanism Driving long periods in stressful environment      Symptom Behavior Details   Primary Pain Symptoms Location: L) medial shoulder blade region  Quality: aching, searing   Frequency: intermittent   Worst Pain 7/10   Best Pain 2/10   Symptom Provocators Work as , driving longer periods   Symptom Relievers Massage/pressure on that spot   Time of day dependent? no   Symptom change since onset? Improved somewhat      Prior Testing/Intervention for current condition:  Prior Tests none   Prior Treatment chiropractic      Lifestyle & General Medical History  General Health Reported by Patient good   Employment/Activities Works as    Pertinent medical/surgical history Depression, mental illness   Patient Goals Reduce pain     SHOULDER:    Cervical Screen: WNL    Shoulder:   PROM L PROM R AROM L AROM R MMT L MMT R   Flex   WNL  5/5    Abd   WNL  5/5    IR   WNL  5/5    ER   WNL  5/5    Ext/IR           Scapulothoraic Rhythm: mild winging on L); weakness of scapular stabilizers noted with testing    Palpation: tender, pain reproduction medial border of scapula/rhomboids, levator insertion    THORACIC:    Posture:  slightly rounded shoulders    Neurological:    Motor Deficit, Sensory Deficit, Reflexes, Dural Signs: none noted    Thoracic AROM: (Major, Moderate, Minimal or Nil loss)  Movement Loss Daniel Mod Min Nil Pain   Flexion    X no   Extension   X  no   Rotation L    X no   Rotation R    X no   Other            Patient is a 28 year old female with thoracic and left side shoulder complaints.    Patient has the following significant findings with corresponding treatment plan.                Diagnosis 1:  L) upper back pain  Pain -  manual therapy, self management, education and home program  Decreased ROM/flexibility - manual therapy and therapeutic exercise  Decreased strength - therapeutic exercise and therapeutic activities  Impaired muscle performance - neuro re-education  Decreased function - therapeutic activities    Therapy Evaluation Codes:   1) History comprised of:   Personal factors that impact the plan of care:      None.    Comorbidity factors that impact the plan of care are:      Depression and Mental illness.     Medications impacting care: None.  2) Examination of Body Systems comprised of:   Body structures and functions that impact the plan of care:      Shoulder and Thoracic Spine.   Activity limitations that impact the plan of care are:      Lifting and Working.  3) Clinical presentation characteristics are:   Stable/Uncomplicated.  4) Decision-Making    Low complexity using standardized patient assessment instrument and/or measureable assessment of functional outcome.  Cumulative Therapy Evaluation is: Low complexity.    Previous and current functional limitations:  (See Goal Flow Sheet for this information)    Short term and Long term goals: (See Goal Flow Sheet for this information)     Communication ability:  Patient appears to be able to clearly communicate and understand verbal and written communication and follow directions correctly.  Treatment Explanation - The following has been discussed with  the patient:   RX ordered/plan of care  Anticipated outcomes  Possible risks and side effects  This patient would benefit from PT intervention to resume normal activities.   Rehab potential is good.    Frequency:  2 X a month, once daily  Duration:  for 3 months  Discharge Plan:  Achieve all LTG.  Independent in home treatment program.  Reach maximal therapeutic benefit.    Please refer to the daily flowsheet for treatment today, total treatment time and time spent performing 1:1 timed codes.

## 2019-05-01 NOTE — PROGRESS NOTES
[facility] Initial Evaluation  Subjective:                                       Pertinent medical history includes:  Depression and mental illness (Numbness/tingling, pain at night/rest).          Employment status: .  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                                Objective:  System    Physical Exam    General     ROS    Assessment/Plan:

## 2019-05-13 PROBLEM — M54.9 UPPER BACK PAIN ON LEFT SIDE: Status: RESOLVED | Noted: 2019-04-09 | Resolved: 2019-04-29

## 2019-12-12 ENCOUNTER — OFFICE VISIT (OUTPATIENT)
Dept: DERMATOLOGY | Facility: CLINIC | Age: 29
End: 2019-12-12
Payer: COMMERCIAL

## 2019-12-12 DIAGNOSIS — D22.9 MULTIPLE BENIGN NEVI: ICD-10-CM

## 2019-12-12 DIAGNOSIS — D48.9 NEOPLASM OF UNCERTAIN BEHAVIOR: Primary | ICD-10-CM

## 2019-12-12 ASSESSMENT — PAIN SCALES - GENERAL
PAINLEVEL: NO PAIN (0)
PAINLEVEL: NO PAIN (0)

## 2019-12-12 NOTE — NURSING NOTE
Lidocaine-epinephrine 1-1:675969 % injection   1.25mL once for one use, starting 12/12/2019 ending 12/12/2019,  2mL disp, R-0, injection  Injected by Suzi Mcgregor CMA

## 2019-12-12 NOTE — NURSING NOTE
Dermatology Rooming Note    Shante Lima's goals for this visit include:   Chief Complaint   Patient presents with     Skin Check     Shante is here today for a skin check. She is concerned about a spot on her forehead     Suzi Mcgregor CMA

## 2019-12-12 NOTE — PATIENT INSTRUCTIONS
Wound Care After a Biopsy    What is a skin biopsy?  A skin biopsy allows the doctor to examine a very small piece of tissue under the microscope to determine the diagnosis and the best treatment for the skin condition. A local anesthetic (numbing medicine)  is injected with a very small needle into the skin area to be tested. A small piece of skin is taken from the area. Sometimes a suture (stitch) is used.     What are the risks of a skin biopsy?  I will experience scar, bleeding, swelling, pain, crusting and redness. I may experience incomplete removal or recurrence. Risks of this procedure are excessive bleeding, bruising, infection, nerve damage, numbness, thick (hypertrophic or keloidal) scar and non-diagnostic biopsy.    How should I care for my wound for the first 24 hours?    Keep the wound dry and covered for 24 hours    If it bleeds, hold direct pressure on the area for 15 minutes. If bleeding does not stop then go to the emergency room    Avoid strenuous exercise the first 1-2 days or as your doctor instructs you    How should I care for the wound after 24 hours?    After 24 hours, remove the bandage    You may bathe or shower as normal    If you had a scalp biopsy, you can shampoo as usual and can use shower water to clean the biopsy site daily    Clean the wound twice a day with gentle soap and water    Do not scrub, be gentle    Apply white petroleum/Vaseline after cleaning the wound with a cotton swab or a clean finger, and keep the site covered with a Bandaid /bandage. Bandages are not necessary with a scalp biopsy    If you are unable to cover the site with a Bandaid /bandage, re-apply ointment 2-3 times a day to keep the site moist. Moisture will help with healing    Avoid strenuous activity for first 1-2 days    Avoid lakes, rivers, pools, and oceans until the stitches are removed or the site is healed    How do I clean my wound?    Wash hands thoroughly with soap or use hand  before all  wound care    Clean the wound with gentle soap and water    Apply white petroleum/Vaseline  to wound after it is clean    Replace the Bandaid /bandage to keep the wound covered for the first few days or as instructed by your doctor    If you had a scalp biopsy, warm shower water to the area on a daily basis should suffice    What should I use to clean my wound?     Cotton-tipped applicators (Qtips )    White petroleum jelly (Vaseline ). Use a clean new container and use Q-tips to apply.    Bandaids   as needed    Gentle soap     How should I care for my wound long term?    Do not get your wound dirty    Keep up with wound care for one week or until the area is healed.    A small scab will form and fall off by itself when the area is completely healed. The area will be red and will become pink in color as it heals. Sun protection is very important for how your scar will turn out. Sunscreen with an SPF 30 or greater is recommended once the area is healed.    If you have stitches, stitches need to be removed in 7 days. You may return to our clinic for this or you may have it done locally at your doctor s office.    You should have some soreness but it should be mild and slowly go away over several days. Talk to your doctor about using tylenol for pain,    When should I call my doctor?  If you have increased:     Pain or swelling    Pus or drainage (clear or slightly yellow drainage is ok)    Temperature over 100F    Spreading redness or warmth around wound    When will I hear about my results?  The biopsy results can take 2-3 weeks to come back. The clinic will call you with the results, send you a [facility] message, or have you schedule a follow-up clinic or phone time to discuss the results. Contact our clinics if you do not hear from us in 3 weeks.     Who should I call with questions?    [facility]: [phone]     [facility]: [phone]    For  urgent needs outside of business hours call the [facility] at [phone] and ask for the dermatology resident on call      The ABCDEs of Melanoma  Asymmetry, Border (irregularity), Color (not uniform, changes in color), Diameter (greater than 6 mm which is about the size of a pencil eraser), and Evolving (any changes in preexisting moles)    Skin cancer can develop anywhere on the skin. Ask someone for help when checking your skin, especially in hard to see places. If you notice a mole different from others, or that changes, enlarges, itches, or bleeds (even if it is small), you should see a dermatologist.
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
## 2019-12-16 LAB — COPATH REPORT: NORMAL

## 2025-06-12 ENCOUNTER — OFFICE VISIT (OUTPATIENT)
Dept: FAMILY MEDICINE | Facility: CLINIC | Age: 35
End: 2025-06-12
Payer: COMMERCIAL

## 2025-06-12 ENCOUNTER — RESULTS FOLLOW-UP (OUTPATIENT)
Dept: FAMILY MEDICINE | Facility: CLINIC | Age: 35
End: 2025-06-12

## 2025-06-12 VITALS
WEIGHT: 171 LBS | TEMPERATURE: 97.9 F | DIASTOLIC BLOOD PRESSURE: 68 MMHG | HEIGHT: 67 IN | HEART RATE: 68 BPM | OXYGEN SATURATION: 100 % | SYSTOLIC BLOOD PRESSURE: 100 MMHG | BODY MASS INDEX: 26.84 KG/M2 | RESPIRATION RATE: 16 BRPM

## 2025-06-12 DIAGNOSIS — Z71.89 ADVANCED DIRECTIVES, COUNSELING/DISCUSSION: ICD-10-CM

## 2025-06-12 DIAGNOSIS — N80.129 ENDOMETRIOMA: ICD-10-CM

## 2025-06-12 DIAGNOSIS — N94.6 DYSMENORRHEA: ICD-10-CM

## 2025-06-12 DIAGNOSIS — N80.9 ENDOMETRIOSIS: Primary | ICD-10-CM

## 2025-06-12 DIAGNOSIS — Z00.00 ROUTINE MEDICAL EXAM: ICD-10-CM

## 2025-06-12 LAB
BASOPHILS # BLD AUTO: 0 10E3/UL (ref 0–0.2)
BASOPHILS NFR BLD AUTO: 0 %
EOSINOPHIL # BLD AUTO: 0.1 10E3/UL (ref 0–0.7)
EOSINOPHIL NFR BLD AUTO: 1 %
ERYTHROCYTE [DISTWIDTH] IN BLOOD BY AUTOMATED COUNT: 12 % (ref 10–15)
EST. AVERAGE GLUCOSE BLD GHB EST-MCNC: 97 MG/DL
HBA1C MFR BLD: 5 % (ref 0–5.6)
HCT VFR BLD AUTO: 40.3 % (ref 35–47)
HGB BLD-MCNC: 13.9 G/DL (ref 11.7–15.7)
IMM GRANULOCYTES # BLD: 0 10E3/UL
IMM GRANULOCYTES NFR BLD: 0 %
LYMPHOCYTES # BLD AUTO: 1.8 10E3/UL (ref 0.8–5.3)
LYMPHOCYTES NFR BLD AUTO: 30 %
MCH RBC QN AUTO: 31.5 PG (ref 26.5–33)
MCHC RBC AUTO-ENTMCNC: 34.5 G/DL (ref 31.5–36.5)
MCV RBC AUTO: 91 FL (ref 78–100)
MONOCYTES # BLD AUTO: 0.6 10E3/UL (ref 0–1.3)
MONOCYTES NFR BLD AUTO: 10 %
NEUTROPHILS # BLD AUTO: 3.4 10E3/UL (ref 1.6–8.3)
NEUTROPHILS NFR BLD AUTO: 58 %
PLATELET # BLD AUTO: 287 10E3/UL (ref 150–450)
RBC # BLD AUTO: 4.41 10E6/UL (ref 3.8–5.2)
WBC # BLD AUTO: 5.9 10E3/UL (ref 4–11)

## 2025-06-12 RX ORDER — NAPROXEN 500 MG/1
500 TABLET ORAL 2 TIMES DAILY WITH MEALS
Qty: 35 TABLET | Refills: 5 | Status: SHIPPED | OUTPATIENT
Start: 2025-06-12

## 2025-06-12 RX ORDER — IBUPROFEN 600 MG/1
600 TABLET, FILM COATED ORAL EVERY 6 HOURS PRN
COMMUNITY

## 2025-06-12 ASSESSMENT — PAIN SCALES - GENERAL: PAINLEVEL_OUTOF10: MODERATE PAIN (4)

## 2025-06-12 NOTE — PROGRESS NOTES
"  Assessment & Plan       ICD-10-CM    1. Endometriosis  N80.9 Ob/Gyn  Referral     naproxen (NAPROSYN) 500 MG tablet      2. Endometrioma  N80.129 Ob/Gyn  Referral      3. Routine medical exam  Z00.00 CBC with Platelets & Differential     Lipid panel reflex to direct LDL Non-fasting     Hemoglobin A1c     TSH with free T4 reflex     CBC with Platelets & Differential     Lipid panel reflex to direct LDL Non-fasting     Hemoglobin A1c     TSH with free T4 reflex      4. Advanced directives, counseling/discussion  Z71.89       5. Dysmenorrhea  N94.6 naproxen (NAPROSYN) 500 MG tablet            Referral placed for [place].  Discussed NSAID use and scheduling medications with Naprosyn, versus ibuprofen.  Less chance of missing doses and will have very good outcomes with bleeding and pain control likely.  Discussed preconception counseling with prenatal vitamin to increase folic acid levels.  Healthy habits and avoidance of alcohol and high doses, eating healthy and exercise.    Discussed advance directives.    Updating labs for future physical exam.    Tetanus updated.      BMI  Estimated body mass index is 27.19 kg/m  as calculated from the following:    Height as of this encounter: 1.689 m (5' 6.5\").    Weight as of this encounter: 77.6 kg (171 lb).   Weight management plan: Discussed healthy diet and exercise guidelines    The longitudinal plan of care for the diagnosis(es)/condition(s) as documented were addressed during this visit. Due to the added complexity in care, I will continue to support Shante in the subsequent management and with ongoing continuity of care.  Follow-up     1-3 months for Pap/PE      Subjective   Shante is a 34 year old, presenting for the following health issues:  Establish Care, Abdominal Pain, and Referral      6/12/2025     2:04 PM   Additional Questions   Roomed by BT   Accompanied by self         6/12/2025     2:04 PM   Patient Reported Additional Medications   Patient " "reports taking the following new medications nsaids     History of Present Illness       Reason for visit:  Endometriosis    She eats 2-3 servings of fruits and vegetables daily.She consumes 0 sweetened beverage(s) daily.She exercises with enough effort to increase her heart rate 60 or more minutes per day.  She exercises with enough effort to increase her heart rate 5 days per week.   She is taking medications regularly.    - Here to Establish care  - History of endometriosis and would like a referral back to [facility] as that is where she was being treated for endometrioma and surgical intervention.     Needs referral, to [place].  Chart reviewed.  Due for Pap/PE.        Patient is engaged, open to advanced directive discussion for her fiancé.  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  On current over-the-counter multivitamin.  Has a 28-day cycle.  Takes premenstrual ibuprofen, but states her pain is not well-controlled if she misses nighttime dosing.    Past, social, family history reviewed.     Patient works in [employer] and [employer], [place].      Review of Systems  Constitutional, HEENT, cardiovascular, pulmonary, gi and gu systems are negative, except as otherwise noted.      Objective    /68   Pulse 68   Temp 97.9  F (36.6  C) (Temporal)   Resp 16   Ht 1.689 m (5' 6.5\")   Wt 77.6 kg (171 lb)   LMP 05/29/2025 (Approximate)   SpO2 100%   BMI 27.19 kg/m    Body mass index is 27.19 kg/m .  Physical Exam   GENERAL: alert and no distress  MS: no gross musculoskeletal defects noted, no edema  NEURO: Normal strength and tone, mentation intact and speech normal  PSYCH: mentation appears normal, affect normal/bright            Signed Electronically by: TONNY Perea CNP    "

## 2025-06-24 ENCOUNTER — VIRTUAL VISIT (OUTPATIENT)
Dept: FAMILY MEDICINE | Facility: CLINIC | Age: 35
End: 2025-06-24
Payer: COMMERCIAL

## 2025-06-24 DIAGNOSIS — M54.9 UPPER BACK PAIN ON LEFT SIDE: ICD-10-CM

## 2025-06-24 DIAGNOSIS — M54.2 NECK PAIN: Primary | ICD-10-CM

## 2025-06-24 PROCEDURE — 98005 SYNCH AUDIO-VIDEO EST LOW 20: CPT | Performed by: NURSE PRACTITIONER
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
## 2025-06-25 ENCOUNTER — PATIENT OUTREACH (OUTPATIENT)
Dept: CARE COORDINATION | Facility: CLINIC | Age: 35
End: 2025-06-25
Payer: COMMERCIAL

## 2025-07-03 NOTE — PATIENT INSTRUCTIONS
If you have labs or imaging done, the results will automatically release in DocumentCloud without an interpretation.  Your health care professional will review those results and send an interpretation with recommendations as soon as possible, but this may be 1-3 business days.    If you have any questions regarding your visit, please contact your care team.     [employer]: [phone]  [facility] CLINIC HOURS TELEPHONE NUMBER   Esperanza Carrera, TAYLA Sewell-RN  Tabby-RN  Yanet Coreas-Surgery Scheduler  Shayy-           Tuesday- [place]  8:00 am-4:00 pm    Wednesday- [place] 8:00 am-4:00 pm    Thursday- [place] 8:00 am-4:00 pm    Friday- [place]  8:00 am-4:00 pm [facility]  [address] [place] [address]  PH: [phone]  Fax: [phone]    Imaging Scheduling all locations  PH: [phone] ([place])    [facility]  [address] [place] [address]  [phone]    [facility]  [address] [place] [address]  PH: [phone]     **Surgeries** Our Surgery Schedulers will contact you to schedule. If you do not receive a call within 3 business days, please call [phone].  Urgent Care locations:  [facility]       Monday-Friday   10 am - 8 pm    Saturday and Sunday   9 am - 5 pm   [phone]   If you need a medication refill, please contact your pharmacy. Please allow 3 business days for your refill to be completed.  As always, Thank you for trusting us with your healthcare needs!

## 2025-07-10 ENCOUNTER — OFFICE VISIT (OUTPATIENT)
Dept: OBGYN | Facility: CLINIC | Age: 35
End: 2025-07-10
Payer: COMMERCIAL

## 2025-07-10 VITALS
SYSTOLIC BLOOD PRESSURE: 104 MMHG | BODY MASS INDEX: 26.93 KG/M2 | HEART RATE: 104 BPM | WEIGHT: 169.4 LBS | DIASTOLIC BLOOD PRESSURE: 71 MMHG | OXYGEN SATURATION: 97 %

## 2025-07-10 DIAGNOSIS — Z12.4 CERVICAL CANCER SCREENING: ICD-10-CM

## 2025-07-10 DIAGNOSIS — Z11.3 SCREENING EXAMINATION FOR VENEREAL DISEASE: ICD-10-CM

## 2025-07-10 DIAGNOSIS — Z01.419 WELL WOMAN EXAM WITH ROUTINE GYNECOLOGICAL EXAM: Primary | ICD-10-CM

## 2025-07-10 NOTE — PROGRESS NOTES
Well Woman Exam Note    SUBJECTIVE:  Shante Lima is a 34 year old female  who presents today for her well woman exam.    Concerns today include:     Fertility: TTC x 4-5 months. Diagnosed endometriosis. She is hoping to have surgery this  for this. Currently no use of contraception. Using NSAIDs for management while on menses.     Menstrual History:      2019     9:00 AM 2025     2:30 PM 7/10/2025     1:30 PM   Menstrual History   LAST MENSTRUAL PERIOD 3/29/2019 2025 2025     Sexual history: Monogamous. Desires STI testing.    Diet/Exercise: Stable    Mental Health: Stable    She has no bowel/bladder concerns today    Last pap:   Lab Results   Component Value Date    PAP NIL 2017   History of abnormal Pap smear: No - age 30- 64 PAP with HPV every 5 years recommended  Pap obtained today:  Yes    Mammogram current: N/A    Lipids: Managed by PCP    Diabetes Screening: Managed by PCP    Colonoscopy: N/A  (Recommended screening begins at age 45 years old)    Lung cancer screening: N/A  (Recommended yearly for people who: have a 20 pack-year or more smoking history AND smoke now or have quit within the past 15 years AND are between 50-80 years old)    DEXA scan: N/A  (Recommended screening begins at age 65 years old)    HISTORY:  Prescription Medications as of 7/10/2025         Rx Number Disp Refills Start End Last Dispensed Date Next Fill Date Owning Pharmacy    ibuprofen (ADVIL/MOTRIN) 600 MG tablet  -- --  --       Sig: Take 600 mg by mouth every 6 hours as needed for other (menstrual pain).    Class: Historical    Route: Oral    naproxen (NAPROSYN) 500 MG tablet  35 tablet 5 2025 --   [id number] - [place] [id number] & [facility]    Sig: Take 1 tablet (500 mg) by mouth 2 times daily (with meals). For menstrual pain    Class: E-Prescribe    Route: Oral          No Known Allergies  Immunization History   Administered  Date(s) Administered    COVID-19 Monovalent 18+ (Moderna) 05/10/2021, 2021, 2022    DTAP (<7y) 1997    Flu, Unspecified 10/06/2009    HPV Quadrivalent 01/15/2007, 2007, 2007    HepB, Unspecified 2003, 2003    Hepatitis A (VAQTA)(ADULT 19+) 2009, 2012    Hepatitis B, Peds (Engerix-B/Recombivax HB) 2004    Historic Hib Hib-titer 1991, 1992, 1992, 1993    Historical DTP/aP 1991, 1992, 1992, 1993    Japanese Encephalitis IM 2012, 2012    MMR (MMRII) 1993, 1997    Meningococcal (Menomune ) 2008    Meningococcal ACWY (Menveo ) 2008    OPV, trivalent, live 1991, 1992, 1993, 1997    TDAP (Adacel,Boostrix) 2025    TDAP Vaccine (Boostrix) 2012    Td (Adult), Adsorbed 2004    Varicella (Varivax) 1997, 2008       OB History    Para Term  AB Living   0 0 0 0 0 0   SAB IAB Ectopic Multiple Live Births   0 0 0 0 0      Past Medical History:   Diagnosis Date    Endometrioma     Endometriosis      Past Surgical History:   Procedure Laterality Date    LEEP TX, CERVICAL  2008     Family History   Problem Relation Age of Onset    Melanoma Mother 62    Melanoma Paternal Grandmother     Breast Cancer Paternal Grandmother     Melanoma Paternal Grandfather     Skin Cancer No family hx of     Diabetes No family hx of     Coronary Artery Disease No family hx of      Social History     Socioeconomic History    Marital status: Single     Spouse name: None    Number of children: None    Years of education: None    Highest education level: None   Occupational History    Occupation: grocery delivery    Occupation:     Occupation: bar tending   Tobacco Use    Smoking status: Never    Smokeless tobacco: Never   Vaping Use    Vaping status: Never Used   Substance and Sexual Activity    Alcohol use: Not Currently     Alcohol/week: 8.0  standard drinks of alcohol     Types: 8 Standard drinks or equivalent per week    Drug use: No    Sexual activity: Yes     Partners: Male     Birth control/protection: None     Social Drivers of Health     Financial Resource Strain: Low Risk  (6/12/2025)    Financial Resource Strain     Within the past 12 months, have you or your family members you live with been unable to get utilities (heat, electricity) when it was really needed?: No   Food Insecurity: Low Risk  (6/12/2025)    Food Insecurity     Within the past 12 months, did you worry that your food would run out before you got money to buy more?: No     Within the past 12 months, did the food you bought just not last and you didn t have money to get more?: No   Transportation Needs: Low Risk  (6/12/2025)    Transportation Needs     Within the past 12 months, has lack of transportation kept you from medical appointments, getting your medicines, non-medical meetings or appointments, work, or from getting things that you need?: No   Physical Activity: Sufficiently Active (6/3/2025)    Received from [facility]    Exercise Vital Sign     Days of Exercise per Week: 5 days     Minutes of Exercise per Session: 150+ min   Interpersonal Safety: Not At Risk (6/14/2024)    Received from [facility]     Humiliation, Afraid, Rape, and Kick questionnaire     Fear of Current or Ex-Partner: No     Emotionally Abused: No     Physically Abused: No     Sexually Abused: No   Housing Stability: Low Risk  (6/12/2025)    Housing Stability     Do you have housing? : Yes     Are you worried about losing your housing?: No       REVIEW of SYSTEMS:  ROS: 10 point ROS neg other than the symptoms noted above in the HPI.     EXAM:  Blood pressure 104/71, pulse 104, weight 76.8 kg (169 lb 6.4 oz), last menstrual period 06/26/2025, SpO2 97%. Body mass index is 26.93 kg/m .  General - pleasant female in no acute distress.  Skin - no suspicious lesions or rashes  BALTAZAR  PERRUPESH  euthyroid with out palpable nodules  Neck - supple without lymphadenopathy.  Lungs - clear to auscultation bilaterally.  Heart - regular rate and rhythm without murmur.  Abdomen - soft, nontender, nondistended, no masses or organomegaly noted.  Musculoskeletal - no gross deformities.  Neurological - normal strength, sensation, and mental status.    Breast Exam:  Breasts: normal without suspicious masses, skin changes or axillary nodes, symmetric fibrous changes in both upper outer quadrants, self exam is taught and encouraged.     Pelvic Exam:  EG/BUS: Normal genital architecture without lesions, erythema or abnormal secretions Bartholin's, Urethra, Point's normal  Urethral meatus: normal   Urethra: no masses, tenderness, or scarring   Bladder: no masses or tenderness   Vagina: moist, pink, rugae with creamy, white, and odorless  secretions  Cervix: Nulliparous, no lesions, and pink, moist, closed, without lesion or CMT  Uterus: anteverted,   Adnexa: Within normal limits and No masses, nodularity, tenderness  Rectum:anus normal     ASSESSMENT/PLAN:  (Z01.419) Well woman exam with routine gynecological exam  (primary encounter diagnosis)  Comment: Additional teaching done at this visit included: self breast exam, birth control, and preconception; Discussed with patient risks/ benefits and treatment options of prescribed medications or other treatment modalities; RTC in one year for annual exam or with concerns.   Plan: HPV and Gynecologic Cytology Panel -         Recommended Age 30 - 65 Years, Chlamydia         trachomatis PCR, Neisseria gonorrhoeae PCR          (Z12.4) Cervical cancer screening  Plan: HPV and Gynecologic Cytology Panel -         Recommended Age 30 - 65 Years          (Z11.3) Screening examination for venereal disease  Plan: Chlamydia trachomatis PCR, Neisseria         gonorrhoeae PCR    -We discussed that pursuing next steps in fertility is up to her- advised she try for at least 6 months, however,  with her significant hx of endometriosis we discussed the implications this can have on fertility. She is aware and has no further questions at this time. She knows to proceed with ALANNA if not conceiving.           TONNY Heart CNP

## 2025-07-15 LAB
BKR AP ASSOCIATED HPV REPORT: NORMAL
BKR LAB AP GYN ADEQUACY: NORMAL
BKR LAB AP GYN INTERPRETATION: NORMAL
BKR LAB AP PREVIOUS ABNORMAL: NORMAL
PATH REPORT.COMMENTS IMP SPEC: NORMAL
PATH REPORT.COMMENTS IMP SPEC: NORMAL
PATH REPORT.RELEVANT HX SPEC: NORMAL

## 2025-07-16 LAB
HPV HR 12 DNA CVX QL NAA+PROBE: NEGATIVE
HPV16 DNA CVX QL NAA+PROBE: NEGATIVE
HPV18 DNA CVX QL NAA+PROBE: NEGATIVE
HUMAN PAPILLOMA VIRUS FINAL DIAGNOSIS: NORMAL

## 2025-07-17 SDOH — HEALTH STABILITY: PHYSICAL HEALTH: ON AVERAGE, HOW MANY DAYS PER WEEK DO YOU ENGAGE IN MODERATE TO STRENUOUS EXERCISE (LIKE A BRISK WALK)?: 5 DAYS

## 2025-07-17 SDOH — HEALTH STABILITY: PHYSICAL HEALTH: ON AVERAGE, HOW MANY MINUTES DO YOU ENGAGE IN EXERCISE AT THIS LEVEL?: 150+ MIN

## 2025-07-22 ENCOUNTER — ANCILLARY PROCEDURE (OUTPATIENT)
Dept: GENERAL RADIOLOGY | Facility: CLINIC | Age: 35
End: 2025-07-22
Attending: STUDENT IN AN ORGANIZED HEALTH CARE EDUCATION/TRAINING PROGRAM
Payer: COMMERCIAL

## 2025-07-22 ENCOUNTER — OFFICE VISIT (OUTPATIENT)
Dept: ORTHOPEDICS | Facility: CLINIC | Age: 35
End: 2025-07-22
Attending: NURSE PRACTITIONER
Payer: COMMERCIAL

## 2025-07-22 DIAGNOSIS — M54.9 UPPER BACK PAIN ON LEFT SIDE: ICD-10-CM

## 2025-07-22 DIAGNOSIS — M54.2 NECK PAIN: ICD-10-CM

## 2025-07-22 DIAGNOSIS — M79.18 MYOFASCIAL PAIN SYNDROME: Primary | ICD-10-CM

## 2025-07-22 DIAGNOSIS — M54.2 CERVICALGIA: ICD-10-CM

## 2025-07-22 PROCEDURE — 72040 X-RAY EXAM NECK SPINE 2-3 VW: CPT | Mod: TC | Performed by: RADIOLOGY

## 2025-07-22 PROCEDURE — 73030 X-RAY EXAM OF SHOULDER: CPT | Mod: TC | Performed by: RADIOLOGY

## 2025-07-22 PROCEDURE — 99203 OFFICE O/P NEW LOW 30 MIN: CPT | Performed by: STUDENT IN AN ORGANIZED HEALTH CARE EDUCATION/TRAINING PROGRAM

## 2025-07-22 NOTE — PROGRESS NOTES
Shante Lima  :  1990  DOS: 2025  MRN: [id number]  PCP: Lucille Delgado    Sports Medicine Clinic Visit      HPI  Shante Lima is a 34 year old female who is seen in consultation at the request of  Lucille Delgado C.N.P. presenting with left shoulder and left sided neck pain.    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  Frequent head turning to look out for people in the crowd, started to feel sore and difficulty with cervical range of motion after that time.  Off-and-on symptoms over the years.  - Pertinent history and prior evaluations:    - 2025 with TONNY Nair CNP. Referred to orthopedics for consideration of treatment options..     - Pain Character:    - Location:  left base of the neck, periscapular musculature, points to the levator scapula region  - Character:  ache, occasionally sharp depending on movements  - Duration:  since   - Course:  waxing/waning  - Endorses:    - numbness at base of left neck occasionally, possible muscle bulging/tightness, lack of hot/cold sensation over posterior left shoulder with showering.  - Denies:    - constant n/t, mechanical locking, instability  - Alleviating factors:    - Massage, acupuncture, chiro, some topicals, stretching, cervical pillow  - Aggravating factors:    - driving, dehydration, certain neck positions  - Other treatments tried:    - acupuncture, massage, chiro, icyhot, ice/heat, stretching, PT 10 years ago, activity modifications, rest, cervical pillow     - Patient Goals:    - get a formal diagnosis, discuss treatment options  - Social History:   - Employed.      Review of Systems  Musculoskeletal: as above  Remainder of review of systems is negative including constitutional, CV, pulmonary, GI, Skin and Neurologic except as noted in HPI or medical history.    Past Medical History:   Diagnosis Date    Endometrioma     Endometriosis      Past Surgical History:   Procedure Laterality  Date    LEEP TX, CERVICAL  2008     Family History   Problem Relation Age of Onset    Melanoma Mother 62    Melanoma Paternal Grandmother     Breast Cancer Paternal Grandmother     Melanoma Paternal Grandfather     Skin Cancer No family hx of     Diabetes No family hx of     Coronary Artery Disease No family hx of          Objective  LMP 06/26/2025 (Approximate)     General: healthy, alert and in no acute distress.    HEENT: no scleral icterus or conjunctival erythema.   Skin: no suspicious lesions or rash. No jaundice.   CV: regular rhythm by palpation, 2+ distal pulses.  Resp: normal respiratory effort without conversational dyspnea.   Psych: normal mood and affect.    Gait: nonantalgic, appropriate coordination and balance.     Neuro:        - Sensation to light touch:    - Intact throughout the BUE including all peripheral nerve distributions.        - MSR:       RUE  LUE  - Biceps  2+ 2+  - Brachioradialis 2+ 2+  - Triceps  2+ 2+       - Special tests:   - Spurling's:  Neg     MSK - Shoulder:       - Inspection:    - No significant swelling, erythema, warmth, ecchymosis, lesion, or atrophy noted.        - ROM:    - Full AROM/PROM       - Palpation:    - TTP at the left sided levator scapula, which is quite tight by palpation today.   - NTTP elsewhere.        - Strength:  (*antalgic)  - Shoulder Abduction   5    - Shoulder Flexion   5    - Shoulder Internal Rotation  5    - Shoulder External Rotation  5             - Special tests:        - Muse:  Neg    - Neers:  Neg    - Empty can:  Neg for pain and weakness    - Muhlenberg:  Neg    - Scarf:  Neg    - Speeds:  Neg     Radiology  I independently reviewed the available relevant imaging in the chart with my interpretations as above in HPI.     I independently reviewed today's new relevant imaging, with the following interpretation:  - XR L shoulder 7/22/2025 shows normal anatomic alignment without acute fracture or osseous abnormality.  - XR cervical spine  7/22/2025 shows straightening of cervical lordosis, no spondylolisthesis, no early degenerative changes, no fractures.      Assessment  1. Myofascial pain syndrome    2. Cervicalgia        Plan  Shante Lima is a pleasant 34 year old female that presents with longstanding chronic left-sided neck/upper back pain.  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  She was sharply painful and very stiff and sore afterward and seems to have had tightness and discomfort off and on over the years since that time.  Recently, she felt like there was more swelling in the area, impaired sensation, and more discomfort.  No specific inciting injury.  She has managed the pain with chiropractic care, acupuncture, massage, stretching, physical therapy (about 10 years ago and), topical medications, NSAIDs.  Able to manage the pain and keep up with her range of motion and strength well on her own.  Her main concern is this increase in swelling and wanting to rule out any underlying more severe pathology before continuing with conservative treatments.      On exam, she does point to her levator scapula on the left as her most bothersome area of discomfort.  On palpation, she does have significant tenderness and tightness in the musculature.  Palpable trigger point within the muscle.  No significant fluid-filled pocket palpable.  Reassuring radiographs for no underlying osseous abnormality or obvious soft tissue abnormality.  She has full ROM and strength at the shoulder and neck, with some mild stiffness and discomfort when activating the levator scapula.  History and physical exam appear most consistent with chronic cervicalgia, myofascial pain syndrome.     We discussed the nature of the condition and available treatment options, and mutually agreed upon the  following plan:    - Imaging:          - Reviewed and independently interpreted the relevant imaging in the chart, including any imaging ordered for today's clinic.  - Reviewed results and images with patient.   - Could consider an MRI in the future if symptoms acutely worsen or she develops redness and prominent swelling over the muscle.  - Medications:          - Discussed pharmacologic options for pain relief.   - May use NSAIDs (Ibuprofen, Naproxen) or Acetaminophen (Tylenol) as needed for pain control.   - Do not take these if previously advised to avoid them for other medical conditions.  - May also use topical medications such as lidocaine, IcyHot, BioFreeze, or Voltaren gel as needed for pain control.    - Voltaren gel is an anti-inflammatory cream that may be used up to 4 times per day over the painful area.   - Injections:          - Discussed possible injection options and alternatives.    - Injection options include: Could consider trigger point injections in the future if symptoms persist.  - Therapy:          - Discussed the benefits of therapy vs home exercise program for optimization of range of motion, flexibility, strength, stability and function.   - Previously participated in physical therapy and has the home exercise program at home.  Continue HEP as instructed.  - Modalities:          - May use ice, heat, massage or other modalities as needed.    - May continue acupuncture, massage, chiropractor as needed.  Recommended no HVLA treatments of the cervical spine.  - Activity:          - Encouraged to remain active and participate in regular activities as symptoms allow.   Avoid or modify exacerbating activities as needed.  - Follow up:          - As needed for re-evaluation and update to treatment plan.  - May follow up sooner for new/worsening symptoms.  - May contact clinic by phone or MyChart for questions or concerns.       Damion Hu DO, [facility] - Sports  [facility] - Department of Orthopedic Surgery       Disclaimer:  This note was prepared and written using Dragon Medical dictation software. As a result, there may be errors in the script that have gone undetected. Please consider this when interpreting the information in this note.

## 2025-07-22 NOTE — LETTER
2025      Shante Lima  [address] [place] [address]      Dear Colleague,    Thank you for referring your patient, Shante Lima, to the [facility] [place]. Please see a copy of my visit note below.    Shante Lima  :  1990  DOS: 2025  MRN: [id number]  PCP: Lucille Delgado    Sports Medicine Clinic Visit      HPI  Shante Lima is a 34 year old female who is seen in consultation at the request of  Lucille Delgado C.N.P. presenting with left shoulder and left sided neck pain.    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  Frequent head turning to look out for people in the crowd, started to feel sore and difficulty with cervical range of motion after that time.  Off-and-on symptoms over the years.  - Pertinent history and prior evaluations:    - 2025 with TONNY Nair CNP. Referred to orthopedics for consideration of treatment options..     - Pain Character:    - Location:  left base of the neck, periscapular musculature, points to the levator scapula region  - Character:  ache, occasionally sharp depending on movements  - Duration:  since   - Course:  waxing/waning  - Endorses:    - numbness at base of left neck occasionally, possible muscle bulging/tightness, lack of hot/cold sensation over posterior left shoulder with showering.  - Denies:    - constant n/t, mechanical locking, instability  - Alleviating factors:    - Massage, acupuncture, chiro, some topicals, stretching, cervical pillow  - Aggravating factors:    - driving, dehydration, certain neck positions  - Other treatments tried:    - acupuncture, massage, chiro, icyhot, ice/heat, stretching, PT 10 years ago, activity modifications, rest, cervical pillow     - Patient Goals:    - get a formal diagnosis, discuss treatment options  - Social History:   - Employed.      Review of Systems  Musculoskeletal: as above  Remainder of review  of systems is negative including constitutional, CV, pulmonary, GI, Skin and Neurologic except as noted in HPI or medical history.    Past Medical History:   Diagnosis Date     Endometrioma      Endometriosis      Past Surgical History:   Procedure Laterality Date     LEEP TX, CERVICAL  2008     Family History   Problem Relation Age of Onset     Melanoma Mother 62     Melanoma Paternal Grandmother      Breast Cancer Paternal Grandmother      Melanoma Paternal Grandfather      Skin Cancer No family hx of      Diabetes No family hx of      Coronary Artery Disease No family hx of          Objective  LMP 06/26/2025 (Approximate)     General: healthy, alert and in no acute distress.    HEENT: no scleral icterus or conjunctival erythema.   Skin: no suspicious lesions or rash. No jaundice.   CV: regular rhythm by palpation, 2+ distal pulses.  Resp: normal respiratory effort without conversational dyspnea.   Psych: normal mood and affect.    Gait: nonantalgic, appropriate coordination and balance.     Neuro:        - Sensation to light touch:    - Intact throughout the BUE including all peripheral nerve distributions.        - MSR:       RUE  LUE  - Biceps  2+ 2+  - Brachioradialis 2+ 2+  - Triceps  2+ 2+       - Special tests:   - Spurling's:  Neg     MSK - Shoulder:       - Inspection:    - No significant swelling, erythema, warmth, ecchymosis, lesion, or atrophy noted.        - ROM:    - Full AROM/PROM       - Palpation:    - TTP at the left sided levator scapula, which is quite tight by palpation today.   - NTTP elsewhere.        - Strength:  (*antalgic)  - Shoulder Abduction   5    - Shoulder Flexion   5    - Shoulder Internal Rotation  5    - Shoulder External Rotation  5             - Special tests:        - Muse:  Neg    - Neers:  Neg    - Empty can:  Neg for pain and weakness    - Weston:  Neg    - Scarf:  Neg    - Speeds:  Neg     Radiology  I independently reviewed the available relevant imaging in the chart  with my interpretations as above in HPI.     I independently reviewed today's new relevant imaging, with the following interpretation:  - XR L shoulder 7/22/2025 shows normal anatomic alignment without acute fracture or osseous abnormality.  - XR cervical spine 7/22/2025 shows straightening of cervical lordosis, no spondylolisthesis, no early degenerative changes, no fractures.      Assessment  1. Myofascial pain syndrome    2. Cervicalgia        Plan  Shante Lima is a pleasant 34 year old female that presents with longstanding chronic left-sided neck/upper back pain.  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  She was sharply painful and very stiff and sore afterward and seems to have had tightness and discomfort off and on over the years since that time.  Recently, she felt like there was more swelling in the area, impaired sensation, and more discomfort.  No specific inciting injury.  She has managed the pain with chiropractic care, acupuncture, massage, stretching, physical therapy (about 10 years ago and), topical medications, NSAIDs.  Able to manage the pain and keep up with her range of motion and strength well on her own.  Her main concern is this increase in swelling and wanting to rule out any underlying more severe pathology before continuing with conservative treatments.      On exam, she does point to her levator scapula on the left as her most bothersome area of discomfort.  On palpation, she does have significant tenderness and tightness in the musculature.  Palpable trigger point within the muscle.  No significant fluid-filled pocket palpable.  Reassuring radiographs for no underlying osseous abnormality or obvious soft tissue abnormality.  She has full ROM and strength at the shoulder and neck, with some mild stiffness  and discomfort when activating the levator scapula.  History and physical exam appear most consistent with chronic cervicalgia, myofascial pain syndrome.     We discussed the nature of the condition and available treatment options, and mutually agreed upon the following plan:    - Imaging:          - Reviewed and independently interpreted the relevant imaging in the chart, including any imaging ordered for today's clinic.  - Reviewed results and images with patient.   - Could consider an MRI in the future if symptoms acutely worsen or she develops redness and prominent swelling over the muscle.  - Medications:          - Discussed pharmacologic options for pain relief.   - May use NSAIDs (Ibuprofen, Naproxen) or Acetaminophen (Tylenol) as needed for pain control.   - Do not take these if previously advised to avoid them for other medical conditions.  - May also use topical medications such as lidocaine, IcyHot, BioFreeze, or Voltaren gel as needed for pain control.    - Voltaren gel is an anti-inflammatory cream that may be used up to 4 times per day over the painful area.   - Injections:          - Discussed possible injection options and alternatives.    - Injection options include: Could consider trigger point injections in the future if symptoms persist.  - Therapy:          - Discussed the benefits of therapy vs home exercise program for optimization of range of motion, flexibility, strength, stability and function.   - Previously participated in physical therapy and has the home exercise program at home.  Continue HEP as instructed.  - Modalities:          - May use ice, heat, massage or other modalities as needed.    - May continue acupuncture, massage, chiropractor as needed.  Recommended no HVLA treatments of the cervical spine.  - Activity:          - Encouraged to remain active and participate in regular activities as symptoms allow.   Avoid or modify exacerbating activities as needed.  - Follow up:           - As needed for re-evaluation and update to treatment plan.  - May follow up sooner for new/worsening symptoms.  - May contact clinic by phone or MyChart for questions or concerns.       Damion Hu DO, LISAM  [facility] - Department of Orthopedic Surgery       Disclaimer:  This note was prepared and written using Dragon Medical dictation software. As a result, there may be errors in the script that have gone undetected. Please consider this when interpreting the information in this note.      Again, thank you for allowing me to participate in the care of your patient.        Sincerely,        Damion Hu DO    Electronically signed

## 2025-07-27 SDOH — HEALTH STABILITY: PHYSICAL HEALTH: ON AVERAGE, HOW MANY MINUTES DO YOU ENGAGE IN EXERCISE AT THIS LEVEL?: 150+ MIN

## 2025-07-27 SDOH — HEALTH STABILITY: PHYSICAL HEALTH: ON AVERAGE, HOW MANY DAYS PER WEEK DO YOU ENGAGE IN MODERATE TO STRENUOUS EXERCISE (LIKE A BRISK WALK)?: 5 DAYS

## 2025-07-27 ASSESSMENT — SOCIAL DETERMINANTS OF HEALTH (SDOH): HOW OFTEN DO YOU GET TOGETHER WITH FRIENDS OR RELATIVES?: ONCE A WEEK

## 2025-07-28 ENCOUNTER — OFFICE VISIT (OUTPATIENT)
Dept: FAMILY MEDICINE | Facility: CLINIC | Age: 35
End: 2025-07-28
Payer: COMMERCIAL

## 2025-07-28 VITALS
SYSTOLIC BLOOD PRESSURE: 108 MMHG | HEIGHT: 67 IN | WEIGHT: 174.4 LBS | RESPIRATION RATE: 16 BRPM | BODY MASS INDEX: 27.37 KG/M2 | TEMPERATURE: 98.4 F | OXYGEN SATURATION: 98 % | HEART RATE: 60 BPM | DIASTOLIC BLOOD PRESSURE: 75 MMHG

## 2025-07-28 DIAGNOSIS — Z11.59 NEED FOR HEPATITIS C SCREENING TEST: ICD-10-CM

## 2025-07-28 DIAGNOSIS — Z00.00 ROUTINE MEDICAL EXAM: Primary | ICD-10-CM

## 2025-07-28 DIAGNOSIS — H10.45 CHRONIC ALLERGIC CONJUNCTIVITIS: ICD-10-CM

## 2025-07-28 DIAGNOSIS — E78.5 HYPERLIPIDEMIA LDL GOAL <130: ICD-10-CM

## 2025-07-28 LAB
CHOLEST SERPL-MCNC: 257 MG/DL
FASTING STATUS PATIENT QL REPORTED: NO
HCV AB SERPL QL IA: NONREACTIVE
HDLC SERPL-MCNC: 85 MG/DL
LDLC SERPL CALC-MCNC: 160 MG/DL
NONHDLC SERPL-MCNC: 172 MG/DL
TRIGL SERPL-MCNC: 61 MG/DL

## 2025-07-28 PROCEDURE — 99395 PREV VISIT EST AGE 18-39: CPT | Performed by: NURSE PRACTITIONER

## 2025-07-28 PROCEDURE — 1125F AMNT PAIN NOTED PAIN PRSNT: CPT | Performed by: NURSE PRACTITIONER

## 2025-07-28 PROCEDURE — 80061 LIPID PANEL: CPT | Performed by: NURSE PRACTITIONER

## 2025-07-28 PROCEDURE — 36415 COLL VENOUS BLD VENIPUNCTURE: CPT | Performed by: NURSE PRACTITIONER

## 2025-07-28 PROCEDURE — 86803 HEPATITIS C AB TEST: CPT | Performed by: NURSE PRACTITIONER

## 2025-07-28 PROCEDURE — 3074F SYST BP LT 130 MM HG: CPT | Performed by: NURSE PRACTITIONER

## 2025-07-28 PROCEDURE — 3078F DIAST BP <80 MM HG: CPT | Performed by: NURSE PRACTITIONER

## 2025-07-28 PROCEDURE — 99213 OFFICE O/P EST LOW 20 MIN: CPT | Mod: 25 | Performed by: NURSE PRACTITIONER

## 2025-07-28 RX ORDER — TRIAMCINOLONE ACETONIDE 1 MG/G
OINTMENT TOPICAL 2 TIMES DAILY
COMMUNITY

## 2025-07-28 RX ORDER — MELOXICAM 15 MG/1
15 TABLET ORAL DAILY
COMMUNITY
Start: 2025-07-22

## 2025-07-28 RX ORDER — OLOPATADINE HYDROCHLORIDE 1 MG/ML
1 SOLUTION OPHTHALMIC 2 TIMES DAILY
Qty: 5 ML | Refills: 3 | Status: SHIPPED | OUTPATIENT
Start: 2025-07-28

## 2025-07-28 ASSESSMENT — PAIN SCALES - GENERAL: PAINLEVEL_OUTOF10: MODERATE PAIN (4)

## 2025-07-28 NOTE — LETTER
2025    Shante Lima   1990        To Whom it May Concern;    Shante Lima was seen today. She has a diagnosis of Endometriosis. It is recommended she work up to 5 days in a row- Maximum. As work beyond this exacerbates her symptoms.   She has no other restrictions.     Sincerely,    electronically    TONNY Perea CNP

## 2025-07-28 NOTE — PATIENT INSTRUCTIONS
"Patient Education   Preventive Care Advice   This is general advice we often give to help people stay healthy. Your care team may have specific advice just for you. Please talk to your care team about your own preventive care needs.  Lifestyle  Exercise at least 150 minutes each week (30 minutes a day, 5 days a week).  Do muscle strengthening activities 2 days a week. These help control your weight and prevent disease.  No smoking.  Wear sunscreen to prevent skin cancer.  Take time with family and friends.  Have your home tested for radon every 2 to 5 years. Radon is a colorless, odorless gas that can harm your lungs. To learn more, go to www.health.[facility].[place].us and search for \"Radon in Homes.\"  Keep guns unloaded and locked up in a safe place like a safe or gun vault, or, use a gun lock and hide the keys. Always lock away bullets separately. To learn more, visit [place].gov and search for \"safe gun storage.\"  Nutrition  Eat 5 or more servings of fruits and vegetables each day.  Try wheat bread, brown rice and whole grain pasta (instead of white bread, rice, and pasta).  Get enough calcium and vitamin D. Check the label on foods and aim for 100% of the RDA (recommended daily allowance).  Regular exams  Have a dental exam and cleaning every 6 months.  Older adults: Ask your care team how often to have memory testing.  See your health care team every year to talk about:  Any changes in your health.  Any medicines your care team has prescribed.  Preventive care, family planning, and ways to prevent chronic diseases.  Shots (vaccines)   HPV shots (up to age 26), if you've never had them before.  Hepatitis B shots (up to age 59), if you've never had them before.  COVID-19 shot: Get this shot when it's due.  Flu shot: Get a flu shot every year.  Tetanus shot: Get a tetanus shot every 10 years.  Pneumococcal, hepatitis A, and RSV shots: Ask your care team if you need these based on your risk.  Shingles shot (for age 50 and " up).  General health tests  Diabetes screening:  Starting at age 35, Get screened for diabetes at least every 3 years.  If you are younger than age 35, ask your care team if you should be screened for diabetes.  Cholesterol test: At age 39, start having a cholesterol test every 5 years, or more often if advised.  Bone density scan (DEXA): At age 50, ask your care team if you should have this scan for osteoporosis (brittle bones).  Hepatitis C: Get tested at least once in your life.  Abdominal aortic aneurysm screening: Talk to your doctor about having this screening if you:  Have ever smoked; and  Are biologically male; and  Are between the ages of 65 and 75.  STIs (sexually transmitted infections)  Before age 24: Ask your care team if you should be screened for STIs.  After age 24: Get screened for STIs if you're at risk. You are at risk for STIs (including HIV) if:  You are sexually active with more than one person.  You don't use condoms every time.  You or a partner was diagnosed with a sexually transmitted infection.  If you are at risk for HIV, ask about PrEP medicine to prevent HIV.  Get tested for HIV at least once in your life, whether you are at risk for HIV or not.  Cancer screening tests  Cervical cancer screening: If you have a cervix, begin getting regular cervical cancer screening tests at age 21. Most people who have regular screenings with normal results can stop after age 65. Talk about this with your provider.  Breast cancer scan (mammogram): If you've ever had breasts, begin having regular mammograms starting at age 40. This is a scan to check for breast cancer.  Colon cancer screening: It is important to start screening for colon cancer at age 45.  Have a colonoscopy test every 10 years (or more often if you're at risk) Or, ask your provider about stool tests like a FIT test every year or Cologuard test every 3 years.  To learn more about your testing options, visit:  www.[employer]/950212.pdf.  For help making a decision, visit: natalio.mary/ho29434.  Prostate cancer screening test: If you have a prostate and are age 55 to 69, ask your provider if you would benefit from a yearly prostate cancer screening test.  Lung cancer screening: If you are a current or former smoker age 50 to 80, ask your care team if ongoing lung cancer screenings are right for you.    For informational purposes only. Not to replace the advice of your health care provider. Copyright   2023 [facility]. All rights reserved. Clinically reviewed by the [facility]. [employer] 194988 - REV 6/25.  Learning About Stress  What is stress?     Stress is your body's response to a hard situation. Your body can have a physical, emotional, or mental response. Stress is a fact of life for most people, and it affects everyone differently. What causes stress for you may not be stressful for someone else.  A lot of things can cause stress. You may feel stress when you go on a job interview, take a test, or run a race. This kind of short-term stress is normal and even useful. It can help you if you need to work hard or react quickly. For example, stress can help you finish an important job on time.  Long-term stress is caused by ongoing stressful situations or events. Examples of long-term stress include long-term health problems, ongoing problems at work, or conflicts in your family. Long-term stress can harm your health.  How does stress affect your health?  When you are stressed, your body responds as though you are in danger. It makes hormones that speed up your heart, make you breathe faster, and give you a burst of energy. This is called the fight-or-flight stress response. If the stress is over quickly, your body goes back to normal and no harm is done.  But if stress happens too often or lasts too long, it can have bad effects. Long-term stress can make you more likely to get sick,  and it can make symptoms of some diseases worse. If you tense up when you are stressed, you may develop neck, shoulder, or low back pain. Stress is linked to high blood pressure and heart disease.  Stress also harms your emotional health. It can make you tejeda, tense, or depressed. Your relationships may suffer, and you may not do well at work or school.  What can you do to manage stress?  You can try these things to help manage stress:   Do something active. Exercise or activity can help reduce stress. Walking is a great way to get started. Even everyday activities such as housecleaning or yard work can help.  Try yoga or mari chi. These techniques combine exercise and meditation. You may need some training at first to learn them.  Do something you enjoy. For example, listen to music or go to a movie. Practice your hobby or do volunteer work.  Meditate. This can help you relax, because you are not worrying about what happened before or what may happen in the future.  Do guided imagery. Imagine yourself in any setting that helps you feel calm. You can use online videos, books, or a teacher to guide you.  Do breathing exercises. For example:  From a standing position, bend forward from the waist with your knees slightly bent. Let your arms dangle close to the floor.  Breathe in slowly and deeply as you return to a standing position. Roll up slowly and lift your head last.  Hold your breath for just a few seconds in the standing position.  Breathe out slowly and bend forward from the waist.  Let your feelings out. Talk, laugh, cry, and express anger when you need to. Talking with supportive friends or family, a counselor, or a shabbir leader about your feelings is a healthy way to relieve stress. Avoid discussing your feelings with people who make you feel worse.  Write. It may help to write about things that are bothering you. This helps you find out how much stress you feel and what is causing it. When you know this,  "you can find better ways to cope.  What can you do to prevent stress?  You might try some of these things to help prevent stress:  Manage your time. This helps you find time to do the things you want and need to do.  Get enough sleep. Your body recovers from the stresses of the day while you are sleeping.  Get support. Your family, friends, and community can make a difference in how you experience stress.  Limit your news feed. Avoid or limit time on social media or news that may make you feel stressed.  Do something active. Exercise or activity can help reduce stress. Walking is a great way to get started.  Where can you learn more?  Go to https://www.[employer].net/patiented  Enter N032 in the search box to learn more about \"Learning About Stress.\"  Current as of: October 24, 2024  Content Version: 14.5    2776-6822 [employer].   Care instructions adapted under license by your healthcare professional. If you have questions about a medical condition or this instruction, always ask your healthcare professional. [employer] disclaims any warranty or liability for your use of this information.       "

## 2025-07-28 NOTE — PROGRESS NOTES
Preventive Care Visit  [facility] TONNY Narvaez CNP, Family Medicine  Jul 28, 2025      Assessment & Plan     ASSESSMENT/ORDERS:    ICD-10-CM    1. Routine medical exam  Z00.00       2. Need for hepatitis C screening test  Z11.59 Hepatitis C Screen Reflex to HCV RNA Quant and Genotype     Hepatitis C Screen Reflex to HCV RNA Quant and Genotype      3. Hyperlipidemia LDL goal <130  E78.5 Lipid panel reflex to direct LDL Fasting     Lipid panel reflex to direct LDL Fasting     OFFICE/OUTPT VISIT,EST,LEVL III      4. Chronic allergic conjunctivitis  H10.45 olopatadine (PATANOL) 0.1 % ophthalmic solution     OFFICE/OUTPT VISIT,EST,LEVL III          Assessment & Plan  Routine Medical Exam   Immunization recommendations reviewed and ordered per discussion and patient agreement.    Discussed medication coverage for vaccines, and if appropriate to have done at pharmacy.    Discussed options and rationale.  Ordered HCM testing per our discussion and patient decision based on USPSTF and Cancer screening guidelines.      Reinforced healthy habits with lifestyle, exercise and nutrition.     Need for hepatitis C screening test:  - Perform hepatitis C screening test.    Hyperlipidemia LDL goal <130:  - Cholesterol was a little high back in June.  - Recheck cholesterol levels today.  - Discussed dietary changes: patient has cut down on red meat and bread, increased beans, fish, and chicken.  - Discussed exercise: patient reports walking up to 6 miles a day, continues yoga, and tries to go to the gym.    Chronic allergic conjunctivitis:  - Itching and erythema in the eyes, possibly not infected as there is no discharge.  - Prescribe Patanol eye drops for allergies. If symptoms persist, consider referral to an eye specialist.    Endometriosis:  - Endometriosis causing menstrual cycle issues and back pain.  - Consider work accommodations with a limit of no more than five days in a row. Further restrictions  should be addressed by gynecology if needing disability.    Seasonal allergies:  - Allergic to pet dander, possibly contributing to eye symptoms.  - Use multiple air filters and steam clean floors to reduce dander.    AI and/or dictation software was used for the creation of this note.    The longitudinal plan of care for the diagnosis(es)/condition(s) as documented were addressed during this visit. Due to the added complexity in care, I will continue to support Shante in the subsequent management and with ongoing continuity of care.     Patient has been advised of split billing requirements and indicates understanding: Yes    Counseling  Appropriate preventive services were addressed with this patient via screening, questionnaire, or discussion as appropriate for fall prevention, nutrition, physical activity, Tobacco-use cessation, social engagement, weight loss and cognition.  Checklist reviewing preventive services available has been given to the patient.  Reviewed patient's diet, addressing concerns and/or questions.   She is at risk for psychosocial distress and has been provided with information to reduce risk.   Reviewed preventive health counseling, as reflected in patient instructions       Regular exercise       Healthy diet/nutrition       Vision screening       Family planning       Hep C screening for all patients one time for ages 18-79 years       Advance Care Planning    Follow-up    Follow-up Visit   Expected date:  Jul 28, 2026 (Approximate)      Follow Up Appointment Details:     Follow-up with whom?: PCP    Follow-Up for what?: Adult Preventive    How?: In Person                 Subjective   Shante is a 34 year old, presenting for the following:  Physical, Medication Request, and Letter for School/Work  - Seasonal allergy medication reccomendations  - Work accomodation for endometriosis pain. 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  "She has a high pain tolerance but it does become too much and would like providers thoughts on how much is too much.       History of Present Illness-  Endometriosis and Menstrual Pain  - History of endometriosis with pain during periods  - Periods have become more manageable recently with current medication, but still experience a 3-hour gap of significant pain as medication wears off  - Has not yet tried Mobic prescribed by OB  - Uses Voltaren gel, started after sports medicine referral, reports it may be helping  - Uses Tylenol (acetaminophen) for pain during gap period, noticed it may slow bleeding  - Reports low back pain and pulling sensation, worsened by prolonged work and wearing jeans with a belt  - Endometriosis involves colon, causing irritation but no bleeding or loose stools  - No current loose stools  - No mention of other gastrointestinal symptoms    Eye Itching and Allergic Symptoms  - Reports severe eye itching for about one month, worse in the morning, sometimes wishes to \"take my eyeballs out\"  - History of similar episode on June 19, 2024, with significant eye swelling, diagnosed as viral and allergic conjunctivitis, treated at that time  - No current eye discharge, but had discharge during previous episode  - Noted right eye redness currently  - Known allergy to pet dander, uses multiple air filters and regular steam cleaning at home    Sleep and Mood  - Reports improved sleep after intensive trauma therapy in 2023  - Mood described as \"hanging in\"    COVID-19  - Had COVID-19 infection in the past, experienced only body aches, no loss of taste or smell    Cervical Health  - Last pap smear on July 10, 2025  - History of LEEP procedure in 2008  - Reports all pap smears since LEEP have been normal    Cholesterol  - Noted high cholesterol in June 2025  - Has made dietary changes since then (cut down red meat and bread, increased beans, fish, chicken)    Exercise and Nutrition  - Reports putting in " about 6 miles a day sometimes, continues with yoga and tries to go to the gym, but feels fatigued with current workload  - Practices restorative yoga    Social history and life stressors  - Reports work is short staffed, leading to increased workload (working up to six days in a row), which exacerbates back pain and fatigue  - Has discussed endometriosis with employer, but they are not familiar with the condition  - Considering job options that would better accommodate health needs, including remote work, but plans to wait until after surgery to make changes      7/28/2025     7:21 AM   Additional Questions   Roomed by AD   Accompanied by Self          HPI     Seeing specialty at [facility]-Dr. Tanner for endometriosis  NSAIDS are working well with BID dosing.       Advance Care Planning    Discussed advance care planning with patient; informed AVS has link to [employer].        7/27/2025   General Health   How would you rate your overall physical health? Good   Feel stress (tense, anxious, or unable to sleep) To some extent   (!) STRESS CONCERN      7/27/2025   Nutrition   Three or more servings of calcium each day? Yes   Diet: Regular (no restrictions)   How many servings of fruit and vegetables per day? (!) 2-3   How many sweetened beverages each day? 0-1         7/27/2025   Exercise   Days per week of moderate/strenous exercise 5 days   Average minutes spent exercising at this level 150+ min         7/27/2025   Social Factors   Frequency of gathering with friends or relatives Once a week   Worry food won't last until get money to buy more No   Food not last or not have enough money for food? No   Do you have housing? (Housing is defined as stable permanent housing and does not include staying outside in a car, in a tent, in an abandoned building, in an overnight shelter, or couch-surfing.) Yes   Are you worried about losing your housing? No   Lack of transportation? No   Unable to get  utilities (heat,electricity)? No         7/27/2025   Dental   Dentist two times every year? Yes           Today's PHQ-2 Score:       6/12/2025     1:58 PM   PHQ-2 ( 1999 [employer])   Q1: Little interest or pleasure in doing things 1   Q2: Feeling down, depressed or hopeless 0   PHQ-2 Score 1    Q1: Little interest or pleasure in doing things Several days   Q2: Feeling down, depressed or hopeless Not at all   PHQ-2 Score 1       Patient-reported         7/27/2025   Substance Use   Alcohol more than 3/day or more than 7/wk No   Do you use any other substances recreationally? No     Social History     Tobacco Use    Smoking status: Never    Smokeless tobacco: Never   Vaping Use    Vaping status: Never Used   Substance Use Topics    Alcohol use: Not Currently     Alcohol/week: 8.0 standard drinks of alcohol     Types: 8 Standard drinks or equivalent per week    Drug use: No          Mammogram Screening - Patient under 40 years of age: Routine Mammogram Screening not recommended.         7/27/2025   STI Screening   New sexual partner(s) since last STI/HIV test? No     History of abnormal Pap smear: No - age 31- 65 PAP with HPV every 5 years recommended LEEP 2008, normal since.         Latest Ref Rng & Units 7/10/2025     2:09 PM 8/3/2017     3:36 PM   PAP / HPV   PAP  Negative for Intraepithelial Lesion or Malignancy (NILM)     PAP (Historical)   NIL    HPV 16 DNA Negative Negative     HPV 18 DNA Negative Negative     Other HR HPV Negative Negative             7/27/2025   Contraception/Family Planning   Questions about contraception or family planning No   What are your periods like? Medium Flow        Reviewed and updated as needed this visit by Provider                    Labs reviewed in EPIC    LDL high in June-       Review of Systems  Constitutional, neuro, ENT, endocrine, pulmonary, cardiac, gastrointestinal, genitourinary, musculoskeletal, integument and psychiatric systems are negative, except as otherwise noted.    "  Objective    Exam  /75   Pulse 60   Temp 98.4  F (36.9  C) (Temporal)   Resp 16   Ht 1.69 m (5' 6.54\")   Wt 79.1 kg (174 lb 6.4 oz)   LMP 07/24/2025 (Within Days)   SpO2 98%   Breastfeeding No   BMI 27.70 kg/m     Estimated body mass index is 27.7 kg/m  as calculated from the following:    Height as of this encounter: 1.69 m (5' 6.54\").    Weight as of this encounter: 79.1 kg (174 lb 6.4 oz).    Physical Exam  GENERAL: alert and no distress  EYES: Eyes grossly normal to inspection, PERRL and conjunctivae and left sclerae normal, right mild scleral injection without drainage  HENT: ear canals and TM's normal, nose and mouth without ulcers or lesions  NECK: no adenopathy, no asymmetry, masses, or scars  RESP: lungs clear to auscultation - no rales, rhonchi or wheezes  CV: regular rate and rhythm, normal S1 S2, no S3 or S4, no murmur, click or rub, no peripheral edema  ABDOMEN: soft, nontender, no hepatosplenomegaly, no masses and bowel sounds normal  MS: no gross musculoskeletal defects noted, no edema  SKIN: no suspicious lesions or rashes  NEURO: Normal strength and tone, mentation intact and speech normal  PSYCH: mentation appears normal, affect normal/bright        Signed Electronically by: TONNY Perea CNP    "

## 2025-08-12 ENCOUNTER — OFFICE VISIT (OUTPATIENT)
Dept: FAMILY MEDICINE | Facility: CLINIC | Age: 35
End: 2025-08-12
Payer: COMMERCIAL

## 2025-08-12 VITALS
RESPIRATION RATE: 15 BRPM | SYSTOLIC BLOOD PRESSURE: 108 MMHG | OXYGEN SATURATION: 98 % | HEART RATE: 77 BPM | BODY MASS INDEX: 26.92 KG/M2 | WEIGHT: 171.5 LBS | TEMPERATURE: 97.9 F | DIASTOLIC BLOOD PRESSURE: 60 MMHG | HEIGHT: 67 IN

## 2025-08-12 DIAGNOSIS — N80.9 ENDOMETRIOSIS: ICD-10-CM

## 2025-08-12 DIAGNOSIS — Z01.818 PREOP GENERAL PHYSICAL EXAM: Primary | ICD-10-CM

## 2025-08-12 LAB
HCG UR QL: NEGATIVE
HGB BLD-MCNC: 12.9 G/DL (ref 11.7–15.7)
MCV RBC AUTO: 92.1 FL (ref 78–100)

## 2025-08-12 PROCEDURE — 85018 HEMOGLOBIN: CPT | Performed by: PHYSICIAN ASSISTANT

## 2025-08-12 PROCEDURE — 99214 OFFICE O/P EST MOD 30 MIN: CPT | Performed by: PHYSICIAN ASSISTANT

## 2025-08-12 PROCEDURE — 81025 URINE PREGNANCY TEST: CPT | Performed by: PHYSICIAN ASSISTANT

## 2025-08-12 PROCEDURE — 3078F DIAST BP <80 MM HG: CPT | Performed by: PHYSICIAN ASSISTANT

## 2025-08-12 PROCEDURE — 36415 COLL VENOUS BLD VENIPUNCTURE: CPT | Performed by: PHYSICIAN ASSISTANT

## 2025-08-12 PROCEDURE — 1125F AMNT PAIN NOTED PAIN PRSNT: CPT | Performed by: PHYSICIAN ASSISTANT

## 2025-08-12 PROCEDURE — 3074F SYST BP LT 130 MM HG: CPT | Performed by: PHYSICIAN ASSISTANT

## 2025-08-12 ASSESSMENT — PAIN SCALES - GENERAL: PAINLEVEL_OUTOF10: SEVERE PAIN (7)
